# Patient Record
Sex: FEMALE | Race: WHITE | NOT HISPANIC OR LATINO | URBAN - METROPOLITAN AREA
[De-identification: names, ages, dates, MRNs, and addresses within clinical notes are randomized per-mention and may not be internally consistent; named-entity substitution may affect disease eponyms.]

---

## 2023-06-16 ENCOUNTER — INPATIENT (INPATIENT)
Facility: HOSPITAL | Age: 28
LOS: 2 days | Discharge: HOME | DRG: 690 | End: 2023-06-19
Attending: HOSPITALIST | Admitting: STUDENT IN AN ORGANIZED HEALTH CARE EDUCATION/TRAINING PROGRAM
Payer: COMMERCIAL

## 2023-06-16 VITALS
TEMPERATURE: 98 F | DIASTOLIC BLOOD PRESSURE: 77 MMHG | HEART RATE: 89 BPM | RESPIRATION RATE: 15 BRPM | HEIGHT: 67 IN | WEIGHT: 209.44 LBS | OXYGEN SATURATION: 97 % | SYSTOLIC BLOOD PRESSURE: 118 MMHG

## 2023-06-16 LAB
ALBUMIN SERPL ELPH-MCNC: 3.6 G/DL — SIGNIFICANT CHANGE UP (ref 3.4–5)
ALP SERPL-CCNC: 264 U/L — HIGH (ref 40–120)
ALT FLD-CCNC: 287 U/L — HIGH (ref 12–42)
ANION GAP SERPL CALC-SCNC: 7 MMOL/L — LOW (ref 9–16)
APPEARANCE UR: CLEAR — SIGNIFICANT CHANGE UP
AST SERPL-CCNC: 181 U/L — HIGH (ref 15–37)
BASOPHILS # BLD AUTO: 0.06 K/UL — SIGNIFICANT CHANGE UP (ref 0–0.2)
BASOPHILS NFR BLD AUTO: 0.7 % — SIGNIFICANT CHANGE UP (ref 0–2)
BILIRUB SERPL-MCNC: 1.5 MG/DL — HIGH (ref 0.2–1.2)
BILIRUB UR-MCNC: NEGATIVE — SIGNIFICANT CHANGE UP
BUN SERPL-MCNC: 10 MG/DL — SIGNIFICANT CHANGE UP (ref 7–23)
CALCIUM SERPL-MCNC: 9.2 MG/DL — SIGNIFICANT CHANGE UP (ref 8.5–10.5)
CHLORIDE SERPL-SCNC: 97 MMOL/L — SIGNIFICANT CHANGE UP (ref 96–108)
CO2 SERPL-SCNC: 25 MMOL/L — SIGNIFICANT CHANGE UP (ref 22–31)
COLOR SPEC: YELLOW — SIGNIFICANT CHANGE UP
CREAT SERPL-MCNC: 0.7 MG/DL — SIGNIFICANT CHANGE UP (ref 0.5–1.3)
DIFF PNL FLD: NEGATIVE — SIGNIFICANT CHANGE UP
EGFR: 121 ML/MIN/1.73M2 — SIGNIFICANT CHANGE UP
EOSINOPHIL # BLD AUTO: 0.16 K/UL — SIGNIFICANT CHANGE UP (ref 0–0.5)
EOSINOPHIL NFR BLD AUTO: 1.8 % — SIGNIFICANT CHANGE UP (ref 0–6)
FLUAV H1 2009 PAND RNA SPEC QL NAA+PROBE: SIGNIFICANT CHANGE UP
FLUAV H1 RNA SPEC QL NAA+PROBE: SIGNIFICANT CHANGE UP
FLUAV H3 RNA SPEC QL NAA+PROBE: SIGNIFICANT CHANGE UP
FLUAV SUBTYP SPEC NAA+PROBE: SIGNIFICANT CHANGE UP
FLUBV RNA SPEC QL NAA+PROBE: SIGNIFICANT CHANGE UP
GLUCOSE SERPL-MCNC: 113 MG/DL — HIGH (ref 70–99)
GLUCOSE UR QL: NEGATIVE MG/DL — SIGNIFICANT CHANGE UP
HCG SERPL-ACNC: 1 MIU/ML — SIGNIFICANT CHANGE UP
HCT VFR BLD CALC: 39.4 % — SIGNIFICANT CHANGE UP (ref 34.5–45)
HGB BLD-MCNC: 13.5 G/DL — SIGNIFICANT CHANGE UP (ref 11.5–15.5)
IMM GRANULOCYTES NFR BLD AUTO: 0.3 % — SIGNIFICANT CHANGE UP (ref 0–0.9)
KETONES UR-MCNC: NEGATIVE MG/DL — SIGNIFICANT CHANGE UP
LACTATE SERPL-SCNC: 0.7 MMOL/L — SIGNIFICANT CHANGE UP (ref 0.4–2)
LEUKOCYTE ESTERASE UR-ACNC: ABNORMAL
LIDOCAIN IGE QN: 104 U/L — SIGNIFICANT CHANGE UP (ref 73–393)
LYMPHOCYTES # BLD AUTO: 1.98 K/UL — SIGNIFICANT CHANGE UP (ref 1–3.3)
LYMPHOCYTES # BLD AUTO: 21.8 % — SIGNIFICANT CHANGE UP (ref 13–44)
MCHC RBC-ENTMCNC: 30.8 PG — SIGNIFICANT CHANGE UP (ref 27–34)
MCHC RBC-ENTMCNC: 34.3 GM/DL — SIGNIFICANT CHANGE UP (ref 32–36)
MCV RBC AUTO: 90 FL — SIGNIFICANT CHANGE UP (ref 80–100)
MONOCYTES # BLD AUTO: 1.4 K/UL — HIGH (ref 0–0.9)
MONOCYTES NFR BLD AUTO: 15.4 % — HIGH (ref 2–14)
NEUTROPHILS # BLD AUTO: 5.45 K/UL — SIGNIFICANT CHANGE UP (ref 1.8–7.4)
NEUTROPHILS NFR BLD AUTO: 60 % — SIGNIFICANT CHANGE UP (ref 43–77)
NITRITE UR-MCNC: NEGATIVE — SIGNIFICANT CHANGE UP
NRBC # BLD: 0 /100 WBCS — SIGNIFICANT CHANGE UP (ref 0–0)
PCO2 BLDV: 51 MMHG — HIGH (ref 39–42)
PH BLDV: 7.35 — SIGNIFICANT CHANGE UP (ref 7.32–7.43)
PH UR: 6.5 — SIGNIFICANT CHANGE UP (ref 5–8)
PLATELET # BLD AUTO: 243 K/UL — SIGNIFICANT CHANGE UP (ref 150–400)
PO2 BLDV: <35 MMHG — SIGNIFICANT CHANGE UP (ref 25–45)
POTASSIUM SERPL-MCNC: 4.1 MMOL/L — SIGNIFICANT CHANGE UP (ref 3.5–5.3)
POTASSIUM SERPL-SCNC: 4.1 MMOL/L — SIGNIFICANT CHANGE UP (ref 3.5–5.3)
PROT SERPL-MCNC: 8.2 G/DL — SIGNIFICANT CHANGE UP (ref 6.4–8.2)
PROT UR-MCNC: NEGATIVE MG/DL — SIGNIFICANT CHANGE UP
RAPID RVP RESULT: SIGNIFICANT CHANGE UP
RBC # BLD: 4.38 M/UL — SIGNIFICANT CHANGE UP (ref 3.8–5.2)
RBC # FLD: 11.1 % — SIGNIFICANT CHANGE UP (ref 10.3–14.5)
SAO2 % BLDV: 27.6 % — LOW (ref 67–88)
SARS-COV-2 RNA SPEC QL NAA+PROBE: SIGNIFICANT CHANGE UP
SODIUM SERPL-SCNC: 129 MMOL/L — LOW (ref 132–145)
SP GR SPEC: 1 — LOW (ref 1–1.03)
UROBILINOGEN FLD QL: 1 MG/DL — SIGNIFICANT CHANGE UP (ref 0.2–1)
WBC # BLD: 9.08 K/UL — SIGNIFICANT CHANGE UP (ref 3.8–10.5)
WBC # FLD AUTO: 9.08 K/UL — SIGNIFICANT CHANGE UP (ref 3.8–10.5)

## 2023-06-16 PROCEDURE — 99285 EMERGENCY DEPT VISIT HI MDM: CPT

## 2023-06-16 PROCEDURE — 74177 CT ABD & PELVIS W/CONTRAST: CPT | Mod: 26

## 2023-06-16 RX ORDER — PIPERACILLIN AND TAZOBACTAM 4; .5 G/20ML; G/20ML
3.38 INJECTION, POWDER, LYOPHILIZED, FOR SOLUTION INTRAVENOUS ONCE
Refills: 0 | Status: COMPLETED | OUTPATIENT
Start: 2023-06-16 | End: 2023-06-16

## 2023-06-16 RX ORDER — KETOROLAC TROMETHAMINE 30 MG/ML
15 SYRINGE (ML) INJECTION ONCE
Refills: 0 | Status: DISCONTINUED | OUTPATIENT
Start: 2023-06-16 | End: 2023-06-16

## 2023-06-16 RX ORDER — CEFTRIAXONE 500 MG/1
1000 INJECTION, POWDER, FOR SOLUTION INTRAMUSCULAR; INTRAVENOUS ONCE
Refills: 0 | Status: COMPLETED | OUTPATIENT
Start: 2023-06-16 | End: 2023-06-16

## 2023-06-16 RX ORDER — SODIUM CHLORIDE 9 MG/ML
1000 INJECTION INTRAMUSCULAR; INTRAVENOUS; SUBCUTANEOUS ONCE
Refills: 0 | Status: COMPLETED | OUTPATIENT
Start: 2023-06-16 | End: 2023-06-16

## 2023-06-16 RX ADMIN — Medication 15 MILLIGRAM(S): at 19:13

## 2023-06-16 RX ADMIN — PIPERACILLIN AND TAZOBACTAM 200 GRAM(S): 4; .5 INJECTION, POWDER, LYOPHILIZED, FOR SOLUTION INTRAVENOUS at 19:57

## 2023-06-16 RX ADMIN — SODIUM CHLORIDE 1000 MILLILITER(S): 9 INJECTION INTRAMUSCULAR; INTRAVENOUS; SUBCUTANEOUS at 18:01

## 2023-06-16 RX ADMIN — CEFTRIAXONE 100 MILLIGRAM(S): 500 INJECTION, POWDER, FOR SOLUTION INTRAMUSCULAR; INTRAVENOUS at 18:46

## 2023-06-16 NOTE — ED PROVIDER NOTE - NS ED ROS FT
CONST: no fevers, no chills  EYES: no pain, no vision changes  ENT: no sore throat, no ear pain, no change in hearing  CV: no chest pain, no leg swelling  RESP: no shortness of breath, no cough  ABD: no abdominal pain, no nausea, no vomiting, no diarrhea  : no dysuria, +flank pain, no hematuria  MSK: +back pain, no extremity pain  NEURO: no headache or additional neurologic complaints  HEME: no easy bleeding  SKIN:  no rash

## 2023-06-16 NOTE — ED ADULT TRIAGE NOTE - PATIENT ON (OXYGEN DELIVERY METHOD)
1. What PRN did patient receive? Anti-Psychotic (Zyprexa/Thorazine/Haldol/Risperdal/Seroquel/Abilify)    2. What was the patient doing that led to the PRN medication? Agitation, Anxiety and Sleep; Patient woke up and was agitated when redirected to bed.    3. Did they require R/S? NO    4. Side effects to PRN medication? None    5. After 1 Hour, patient appeared: Other; patient was still awake and restless; wanted to walk around and watch movies.         room air

## 2023-06-16 NOTE — ED ADULT NURSE NOTE - NSFALLUNIVINTERV_ED_ALL_ED
Bed/Stretcher in lowest position, wheels locked, appropriate side rails in place/Call bell, personal items and telephone in reach/Instruct patient to call for assistance before getting out of bed/chair/stretcher/Non-slip footwear applied when patient is off stretcher/[place] to call system/Physically safe environment - no spills, clutter or unnecessary equipment/Purposeful proactive rounding/Room/bathroom lighting operational, light cord in reach

## 2023-06-16 NOTE — ED PROVIDER NOTE - CLINICAL SUMMARY MEDICAL DECISION MAKING FREE TEXT BOX
28 year old F with PMH anxiety, ADHD, hypothyroidism presenting with L flank pain and hematuria. VSS, afebrile, non toxic appearing. Exam with L CVA tenderness. DDx includes pyelonephritis vs hemorrhagic cystitis vs nephrolithiasis. Will get labs, UA/UC, CT A/P, treat pain. Dispo per findings.

## 2023-06-16 NOTE — ED PROVIDER NOTE - PHYSICAL EXAMINATION
GENERAL: Awake, alert, NAD  HEENT: NC/AT, moist mucous membranes, PERRL, EOMI  LUNGS: CTAB, no wheezes or crackles   CARDIAC: RRR, no m/r/g  ABDOMEN: Soft, normal BS, non tender, non distended, no rebound, no guarding  BACK: No midline spinal tenderness, left CVA tenderness  EXT: No edema, no calf tenderness, 2+ DP pulses bilaterally, no deformities.  NEURO: A&Ox3. Moving all extremities.  SKIN: Warm and dry. No rash.  PSYCH: Normal affect.

## 2023-06-16 NOTE — ED PROVIDER NOTE - OBJECTIVE STATEMENT
28 year old F with PMH anxiety, ADHD, hypothyroidism presenting with L flank pain and hematuria. Patient has had this pain for 1.5 weeks. Seen at [facility] on 6/14. per paperwork review, patient was tachycardic, UA positive for WBC but patient not symptomatic at that time, also found to have elevated LFTs, but no RUQ pain. Patient's pain and tachycardiac improved. Ucx sent. Patient was offered further imaging at that time, but declined, preferred to follow up outpatient. Culture grew e coli, but was not treated with abx. Patient had telehealth visit and was referred to ED to r/o pyelonephritis. Patient notes "low grade fevers," but denies vomiting, diarrhea, dysuria, inability to urinate, SOB, CP. History of laparoscopic abdominal surgery. Remote history of UTIs.

## 2023-06-16 NOTE — ED PROVIDER NOTE - ATTENDING CONTRIBUTION TO CARE
27yo F no pmh presents with 1.5wks of L flank pain, seen at [facility] 2d ago for same, found to have UTI (but not started on abx), also found to have elevated LFTs, but left prior to getting CT a/p.  Now back with worsening pain with fever and chills.  UCx from [facility] +E. Coli.  On exam afebrile, VSS, well appearing, with diffuse abd TTP and L CVA TTP.  WBC wnl.  UA positive for UTI.  CT a/p shows L pyelo with abscess.  Pt given ceftriaxone initially, will expand coverage to zosyn as well.  pt has allergy to vancomycin though symptoms sound like red man syn.

## 2023-06-17 DIAGNOSIS — R74.01 ELEVATION OF LEVELS OF LIVER TRANSAMINASE LEVELS: ICD-10-CM

## 2023-06-17 DIAGNOSIS — N12 TUBULO-INTERSTITIAL NEPHRITIS, NOT SPECIFIED AS ACUTE OR CHRONIC: ICD-10-CM

## 2023-06-17 DIAGNOSIS — Z29.9 ENCOUNTER FOR PROPHYLACTIC MEASURES, UNSPECIFIED: ICD-10-CM

## 2023-06-17 DIAGNOSIS — E87.1 HYPO-OSMOLALITY AND HYPONATREMIA: ICD-10-CM

## 2023-06-17 DIAGNOSIS — E03.9 HYPOTHYROIDISM, UNSPECIFIED: ICD-10-CM

## 2023-06-17 LAB
ANION GAP SERPL CALC-SCNC: 6 MMOL/L — SIGNIFICANT CHANGE UP (ref 5–17)
BASOPHILS # BLD AUTO: 0.04 K/UL — SIGNIFICANT CHANGE UP (ref 0–0.2)
BASOPHILS NFR BLD AUTO: 0.7 % — SIGNIFICANT CHANGE UP (ref 0–2)
BUN SERPL-MCNC: 9 MG/DL — SIGNIFICANT CHANGE UP (ref 7–23)
CALCIUM SERPL-MCNC: 9.1 MG/DL — SIGNIFICANT CHANGE UP (ref 8.4–10.5)
CHLORIDE SERPL-SCNC: 106 MMOL/L — SIGNIFICANT CHANGE UP (ref 96–108)
CO2 SERPL-SCNC: 22 MMOL/L — SIGNIFICANT CHANGE UP (ref 22–31)
CREAT ?TM UR-MCNC: 137 MG/DL — SIGNIFICANT CHANGE UP
CREAT SERPL-MCNC: 0.71 MG/DL — SIGNIFICANT CHANGE UP (ref 0.5–1.3)
EGFR: 119 ML/MIN/1.73M2 — SIGNIFICANT CHANGE UP
EOSINOPHIL # BLD AUTO: 0.12 K/UL — SIGNIFICANT CHANGE UP (ref 0–0.5)
EOSINOPHIL NFR BLD AUTO: 2 % — SIGNIFICANT CHANGE UP (ref 0–6)
GLUCOSE SERPL-MCNC: 123 MG/DL — HIGH (ref 70–99)
HAV IGM SER-ACNC: SIGNIFICANT CHANGE UP
HBV CORE IGM SER-ACNC: SIGNIFICANT CHANGE UP
HBV SURFACE AG SER-ACNC: SIGNIFICANT CHANGE UP
HCT VFR BLD CALC: 34.9 % — SIGNIFICANT CHANGE UP (ref 34.5–45)
HCV AB S/CO SERPL IA: 0.04 S/CO — SIGNIFICANT CHANGE UP
HCV AB SERPL-IMP: SIGNIFICANT CHANGE UP
HETEROPH AB TITR SER AGGL: NEGATIVE — SIGNIFICANT CHANGE UP
HGB BLD-MCNC: 12 G/DL — SIGNIFICANT CHANGE UP (ref 11.5–15.5)
HIV 1+2 AB+HIV1 P24 AG SERPL QL IA: SIGNIFICANT CHANGE UP
IMM GRANULOCYTES NFR BLD AUTO: 0.3 % — SIGNIFICANT CHANGE UP (ref 0–0.9)
LYMPHOCYTES # BLD AUTO: 1.38 K/UL — SIGNIFICANT CHANGE UP (ref 1–3.3)
LYMPHOCYTES # BLD AUTO: 23.1 % — SIGNIFICANT CHANGE UP (ref 13–44)
MAGNESIUM SERPL-MCNC: 2.2 MG/DL — SIGNIFICANT CHANGE UP (ref 1.6–2.6)
MCHC RBC-ENTMCNC: 31.1 PG — SIGNIFICANT CHANGE UP (ref 27–34)
MCHC RBC-ENTMCNC: 34.4 GM/DL — SIGNIFICANT CHANGE UP (ref 32–36)
MCV RBC AUTO: 90.4 FL — SIGNIFICANT CHANGE UP (ref 80–100)
MONOCYTES # BLD AUTO: 0.84 K/UL — SIGNIFICANT CHANGE UP (ref 0–0.9)
MONOCYTES NFR BLD AUTO: 14.1 % — HIGH (ref 2–14)
NEUTROPHILS # BLD AUTO: 3.57 K/UL — SIGNIFICANT CHANGE UP (ref 1.8–7.4)
NEUTROPHILS NFR BLD AUTO: 59.8 % — SIGNIFICANT CHANGE UP (ref 43–77)
NRBC # BLD: 0 /100 WBCS — SIGNIFICANT CHANGE UP (ref 0–0)
OSMOLALITY SERPL: 286 MOSM/KG — SIGNIFICANT CHANGE UP (ref 275–300)
OSMOLALITY UR: 467 MOSM/KG — SIGNIFICANT CHANGE UP (ref 300–900)
PHOSPHATE SERPL-MCNC: 4 MG/DL — SIGNIFICANT CHANGE UP (ref 2.5–4.5)
PLATELET # BLD AUTO: 225 K/UL — SIGNIFICANT CHANGE UP (ref 150–400)
POTASSIUM SERPL-MCNC: 4.2 MMOL/L — SIGNIFICANT CHANGE UP (ref 3.5–5.3)
POTASSIUM SERPL-SCNC: 4.2 MMOL/L — SIGNIFICANT CHANGE UP (ref 3.5–5.3)
RBC # BLD: 3.86 M/UL — SIGNIFICANT CHANGE UP (ref 3.8–5.2)
RBC # FLD: 11.2 % — SIGNIFICANT CHANGE UP (ref 10.3–14.5)
SODIUM SERPL-SCNC: 134 MMOL/L — LOW (ref 135–145)
SODIUM UR-SCNC: 32 MMOL/L — SIGNIFICANT CHANGE UP
WBC # BLD: 5.97 K/UL — SIGNIFICANT CHANGE UP (ref 3.8–10.5)
WBC # FLD AUTO: 5.97 K/UL — SIGNIFICANT CHANGE UP (ref 3.8–10.5)

## 2023-06-17 PROCEDURE — 99223 1ST HOSP IP/OBS HIGH 75: CPT | Mod: GC

## 2023-06-17 PROCEDURE — 76705 ECHO EXAM OF ABDOMEN: CPT | Mod: 26

## 2023-06-17 RX ORDER — LIDOCAINE 4 G/100G
1 CREAM TOPICAL DAILY
Refills: 0 | Status: DISCONTINUED | OUTPATIENT
Start: 2023-06-17 | End: 2023-06-19

## 2023-06-17 RX ORDER — LEVOTHYROXINE SODIUM 125 MCG
75 TABLET ORAL DAILY
Refills: 0 | Status: DISCONTINUED | OUTPATIENT
Start: 2023-06-17 | End: 2023-06-19

## 2023-06-17 RX ORDER — ACETAMINOPHEN 500 MG
650 TABLET ORAL EVERY 6 HOURS
Refills: 0 | Status: DISCONTINUED | OUTPATIENT
Start: 2023-06-17 | End: 2023-06-19

## 2023-06-17 RX ORDER — LEVONORGESTREL 1.5 MG/1
1 TABLET ORAL
Refills: 0 | DISCHARGE

## 2023-06-17 RX ORDER — PIPERACILLIN AND TAZOBACTAM 4; .5 G/20ML; G/20ML
3.38 INJECTION, POWDER, LYOPHILIZED, FOR SOLUTION INTRAVENOUS EVERY 8 HOURS
Refills: 0 | Status: DISCONTINUED | OUTPATIENT
Start: 2023-06-17 | End: 2023-06-17

## 2023-06-17 RX ORDER — KETOROLAC TROMETHAMINE 30 MG/ML
15 SYRINGE (ML) INJECTION ONCE
Refills: 0 | Status: DISCONTINUED | OUTPATIENT
Start: 2023-06-17 | End: 2023-06-17

## 2023-06-17 RX ORDER — LANOLIN ALCOHOL/MO/W.PET/CERES
3 CREAM (GRAM) TOPICAL AT BEDTIME
Refills: 0 | Status: DISCONTINUED | OUTPATIENT
Start: 2023-06-17 | End: 2023-06-19

## 2023-06-17 RX ORDER — CEFTRIAXONE 500 MG/1
2000 INJECTION, POWDER, FOR SOLUTION INTRAMUSCULAR; INTRAVENOUS EVERY 24 HOURS
Refills: 0 | Status: DISCONTINUED | OUTPATIENT
Start: 2023-06-17 | End: 2023-06-19

## 2023-06-17 RX ORDER — ENOXAPARIN SODIUM 100 MG/ML
40 INJECTION SUBCUTANEOUS EVERY 24 HOURS
Refills: 0 | Status: DISCONTINUED | OUTPATIENT
Start: 2023-06-17 | End: 2023-06-19

## 2023-06-17 RX ORDER — LISDEXAMFETAMINE DIMESYLATE 70 MG/1
1 CAPSULE ORAL
Refills: 0 | DISCHARGE

## 2023-06-17 RX ORDER — CEFTRIAXONE 500 MG/1
1000 INJECTION, POWDER, FOR SOLUTION INTRAMUSCULAR; INTRAVENOUS EVERY 24 HOURS
Refills: 0 | Status: DISCONTINUED | OUTPATIENT
Start: 2023-06-17 | End: 2023-06-17

## 2023-06-17 RX ORDER — LEVOTHYROXINE SODIUM 125 MCG
1 TABLET ORAL
Refills: 0 | DISCHARGE

## 2023-06-17 RX ADMIN — LIDOCAINE 1 PATCH: 4 CREAM TOPICAL at 11:58

## 2023-06-17 RX ADMIN — Medication 650 MILLIGRAM(S): at 12:32

## 2023-06-17 RX ADMIN — LIDOCAINE 1 PATCH: 4 CREAM TOPICAL at 23:59

## 2023-06-17 RX ADMIN — PIPERACILLIN AND TAZOBACTAM 25 GRAM(S): 4; .5 INJECTION, POWDER, LYOPHILIZED, FOR SOLUTION INTRAVENOUS at 04:53

## 2023-06-17 RX ADMIN — Medication 650 MILLIGRAM(S): at 17:29

## 2023-06-17 RX ADMIN — Medication 75 MICROGRAM(S): at 06:27

## 2023-06-17 RX ADMIN — LIDOCAINE 1 PATCH: 4 CREAM TOPICAL at 18:00

## 2023-06-17 RX ADMIN — CEFTRIAXONE 100 MILLIGRAM(S): 500 INJECTION, POWDER, FOR SOLUTION INTRAMUSCULAR; INTRAVENOUS at 17:29

## 2023-06-17 RX ADMIN — Medication 650 MILLIGRAM(S): at 11:58

## 2023-06-17 RX ADMIN — Medication 650 MILLIGRAM(S): at 18:00

## 2023-06-17 RX ADMIN — Medication 650 MILLIGRAM(S): at 05:53

## 2023-06-17 RX ADMIN — Medication 650 MILLIGRAM(S): at 04:53

## 2023-06-17 RX ADMIN — Medication 15 MILLIGRAM(S): at 00:42

## 2023-06-17 NOTE — H&P ADULT - PROBLEM SELECTOR PLAN 5
F: None   E: Replete as necessary K>4 Mg>2  N: Reg diet   DVT Prophylaxis: Lovenox 40mg daily   GI prophylaxis: None   CODE STATUS: FULL

## 2023-06-17 NOTE — ED ADULT NURSE REASSESSMENT NOTE - NS ED NURSE REASSESS COMMENT FT1
Pt c/o abdominal pain and headache, requesting pain meds. KENDRA Méndez made aware of pain.
Report received from ISELA Orantes. Pt resting in stretcher comfortably. Denies complaints. Asking for food. Verified with MD Méndez that pt can eat.
recent fall oob/pain, back

## 2023-06-17 NOTE — PATIENT PROFILE ADULT - NSPROLASTMENSTRUAL_GEN_A_NUR
Problem: Patient Education:  Go to Education Activity Goal: Patient/Family Education Outcome: Progressing Towards Goal 
Pt receiving Chema 1/3 
 
 

[facility] VISIT NOTE 
 
1165 Pt arrived at [facility] ambulatory and in no distress for Chema 1/3. Assessment completed, pt c/o headache 7/10 PIV L Hand + blood return with no difficulty. Positive blood return noted Medications received: 
Reglan IVP Decadron IVP Benadryl IVP DHE IV 
Depacon IV Tolerated treatment well, no adverse reaction noted. PIV remained intact for infusion tomorrow and flushed per protocol. Positive blood return noted. Patient Vitals for the past 12 hrs: 
 Temp Pulse BP SpO2  
10/22/18 1154 97.4 °F (36.3 °C) 62 121/75 98 % 10/22/18 0824 97.5 °F (36.4 °C) 60 119/72 98 % [facility] D/C'd from [facility] ambulatory and in no distress accompanied by mother. Next appointment is 10/23/18 at 0800
pt hasn't had her period in 6 years

## 2023-06-17 NOTE — H&P ADULT - ASSESSMENT
29 y/o F with h/o UTIs, ADHD and hypothyroidism, pw 1.5 weeks of left flank pain found to have ecoli in urine and pyelonephritis on imaging, admitted for further yeung.

## 2023-06-17 NOTE — PATIENT PROFILE ADULT - VISION (WITH CORRECTIVE LENSES IF THE PATIENT USUALLY WEARS THEM):
near sighted/Partially impaired: cannot see medication labels or newsprint, but can see obstacles in path, and the surrounding layout; can count fingers at arm's length

## 2023-06-17 NOTE — H&P ADULT - PROBLEM SELECTOR PLAN 1
Urine culture from [facility] with 40 K CFU of ECOLI  UA at that time was pos for pyuria at that time as well  UA is currently neg for pyuria, not having dysuria or frequency, but with imaging suggestive and has CVA tenderness  - FU Urine CX  - IV CTX  - Try to obtain outside urine cultures, patient did not receive abx at [facility] or [facility] in the interim Urine culture from [facility] with 40 K CFU of ECOLI  UA at that time was pos for pyuria at that time as well  UA is currently neg for pyuria, not having dysuria or frequency, but with imaging suggestive and has CVA tenderness  Possibe early abscess on imaging  - IR consult   - FU Urine CX  - IV CTX  - Try to obtain outside urine cultures, patient did not receive abx at [place] or [facility] in the interim

## 2023-06-17 NOTE — H&P ADULT - ATTENDING COMMENTS
Patient was seen and examined at bedside on 6/17/2023 at 930 am. Patient has no acute complaints, overall feels well. Denies SOB, CP. ROS is otherwise negative. Vitals, labwork and pertinent imaging reviewed. Physical exam - NAD, AAO x 4, PERRLA, EOMI, MMM, supple neck, chest - CTA b/l. CV - rrr, s1s2, no m/r/g, abd - soft, NTND, + BS, ext - wwp, psych - normal affect    Plan  -C/w CTX - increase to 2 G  -F/u cultures  -Repeat LFT's, consider RUQ U/S  -ID consult

## 2023-06-17 NOTE — H&P ADULT - HISTORY OF PRESENT ILLNESS
28 year old F with PMH anxiety, ADHD, hypothyroidism presenting with L flank pain and hematuria. Patient has had this pain for 1.5 weeks. Seen at [facility] on 6/14. per paperwork review, patient was tachycardic, UA positive for WBC but patient not symptomatic at that time, also found to have elevated LFTs, but no RUQ pain. Patient's pain and tachycardiac improved. Ucx sent. Patient was offered further imaging at that time, but declined, preferred to follow up outpatient. Culture grew e coli, but was not treated with abx. Patient had telehealth visit and was referred to ED to r/o pyelonephritis. Patient notes "low grade fevers," but denies vomiting, diarrhea, dysuria, inability to urinate, SOB, CP. History of laparoscopic abdominal surgery. Remote history of UTIs. 28 year old F with PMH anxiety, ADHD, hypothyroidism presenting with L flank pain and ?hematuria being admitted for IV abx for pyelonephritis. Patient has had this pain for 1.5 weeks and presented to [facility] on 6/14. Per paperwork review, patient was tachycardic, UA positive for WBC but patient not symptomatic at that time, also found to have elevated LFTs, but no RUQ pain. Patient's pain and tachycardiac improved. Ucx sent. Patient was offered further imaging at that time, but declined, preferred to follow up outpatient and she was not given antibiotics. Cultures later grew e coli. Patient [facility] and was referred to ED to r/o pyelonephritis. Patient notes "low grade fevers," but denies vomiting, diarrhea, dysuria, inability to urinate, SOB, CP. History of laparoscopic abdominal surgery for ectopic pregnanct. Remote history of UTIs but no pyelonephritis. Patient with history of mono and reports having enlargement of liver and spleen on imaging, although not seen in [facility] or our chart. Patient reports ongoing low back pain. No further urinary sx or hematuria.

## 2023-06-17 NOTE — H&P ADULT - PROBLEM SELECTOR PLAN 4
Na 129, labs cw SIADH, likely from pain  Na was 134 on 6/14 at [place]  - mild, can monitor off IVF for now  - tylenol, lidocaine patches for pain

## 2023-06-17 NOTE — H&P ADULT - NSHPLABSRESULTS_GEN_ALL_CORE
LABS:                         13.5   9.08  )-----------( 243      ( 2023 17:34 )             39.4     -16    129<L>  |  97  |  10  ----------------------------<  113<H>  4.1   |  25  |  0.70    Ca    9.2      2023 17:34    TPro  8.2  /  Alb  3.6  /  TBili  1.5<H>  /  DBili  x   /  AST  181<H>  /  ALT  287<H>  /  AlkPhos  264<H>        Urinalysis Basic - ( 2023 17:34 )    Color: Yellow / Appearance: Clear / S.003 / pH: x  Gluc: x / Ketone: Negative mg/dL  / Bili: Negative / Urobili: 1.0 mg/dL   Blood: x / Protein: Negative mg/dL / Nitrite: Negative   Leuk Esterase: Large / RBC: <5 /HPF / WBC 5-10 /HPF   Sq Epi: x / Non Sq Epi: x / Bacteria: Few /HPF      RADIOLOGY, EKG & ADDITIONAL TESTS:

## 2023-06-17 NOTE — H&P ADULT - NSHPPHYSICALEXAM_GEN_ALL_CORE
------------------INCOMPLETE-----------------------  VITAL SIGNS:  T(C): 37 (06-17-23 @ 04:26), Max: 37.6 (06-16-23 @ 20:23)  T(F): 98.6 (06-17-23 @ 04:26), Max: 99.7 (06-16-23 @ 20:23)  HR: 91 (06-17-23 @ 04:14) (75 - 91)  BP: 110/74 (06-17-23 @ 04:14) (104/69 - 139/84)  BP(mean): --  RR: 17 (06-17-23 @ 04:14) (15 - 18)  SpO2: 96% (06-17-23 @ 04:14) (95% - 100%)  Wt(kg): --    PHYSICAL EXAM:    Constitutional: WDWN resting comfortably in bed; NAD  HEENT: NC/AT, PERRL, EOMI, anicteric sclera, no nasal discharge; uvula midline, no oropharyngeal erythema or exudates, MMM; no thyromegaly or anterior/posterior or submental DU; neck supple  Respiratory: CTA B/L; no W/R/R, no retractions  Cardiac: +S1/S2; RRR; no M/R/G appreciated  Gastrointestinal: abdomen soft, NT/ND, +BS, no rebound tenderness or guarding  Back: no CVAT B/L  Extremities: legs WWP, no clubbing or cyanosis; no peripheral edema  Vascular: 2+ distal pedal pulses B/L  Dermatologic: skin warm, dry and intact; no rashes, wounds, or scars  Neurologic: AAOx3; CNII-XII grossly intact; strength 5/5 and sensation intact in all 4 extremities; no focal deficits  Psychiatric: affect and characteristics of appearance, verbalizations, behaviors are appropriate VITAL SIGNS:  T(C): 37 (06-17-23 @ 04:26), Max: 37.6 (06-16-23 @ 20:23)  T(F): 98.6 (06-17-23 @ 04:26), Max: 99.7 (06-16-23 @ 20:23)  HR: 91 (06-17-23 @ 04:14) (75 - 91)  BP: 110/74 (06-17-23 @ 04:14) (104/69 - 139/84)  RR: 17 (06-17-23 @ 04:14) (15 - 18)  SpO2: 96% (06-17-23 @ 04:14) (95% - 100%)    PHYSICAL EXAM:    Constitutional: WDWN resting comfortably in bed; NAD  HEENT: NC/AT, PERRL, EOMI, anicteric sclera, no nasal discharge; uvula midline, no oropharyngeal erythema or exudates, MMM; no thyromegaly or anterior/posterior or submental DU; neck supple  Respiratory: CTA B/L; no W/R/R, no retractions  Cardiac: +S1/S2; RRR; no M/R/G appreciated  Gastrointestinal: abdomen soft, NT/ND, +BS, no rebound tenderness or guarding  Back: no CVAT B/L  Extremities: legs WWP, no clubbing or cyanosis; no peripheral edema  Vascular: 2+ distal pedal pulses B/L  Dermatologic: skin warm, dry and intact; no rashes, wounds, or scars  Neurologic: AAOx3; CNII-XII grossly intact; strength 5/5 and sensation intact in all 4 extremities; no focal deficits  Psychiatric: affect and characteristics of appearance, verbalizations, behaviors are appropriate VITAL SIGNS:  T(C): 37 (06-17-23 @ 04:26), Max: 37.6 (06-16-23 @ 20:23)  T(F): 98.6 (06-17-23 @ 04:26), Max: 99.7 (06-16-23 @ 20:23)  HR: 91 (06-17-23 @ 04:14) (75 - 91)  BP: 110/74 (06-17-23 @ 04:14) (104/69 - 139/84)  RR: 17 (06-17-23 @ 04:14) (15 - 18)  SpO2: 96% (06-17-23 @ 04:14) (95% - 100%)    PHYSICAL EXAM:    Constitutional: WDWN resting comfortably in bed; NAD  HEENT: NC/AT, PERRL, EOMI, anicteric sclera, no nasal discharge; uvula midline, no oropharyngeal erythema or exudates, MMM; no thyromegaly or anterior/posterior or submental DU; neck supple  Respiratory: CTA B/L; no W/R/R, no retractions  Cardiac: +S1/S2; RRR; no M/R/G appreciated  Gastrointestinal: abdomen soft, NT/ND, +BS, + RUQ tenderness  Back: + CVA tenderness   Extremities: legs WWP, no clubbing or cyanosis; no peripheral edema  Vascular: 2+ distal pedal pulses B/L  Dermatologic: skin warm, dry and intact; no rashes, wounds, or scars  Neurologic: AAOx3; CNII-XII grossly intact; strength 5/5 and sensation intact in all 4 extremities; no focal deficits  Psychiatric: affect and characteristics of appearance, verbalizations, behaviors are appropriate

## 2023-06-18 DIAGNOSIS — N15.1 RENAL AND PERINEPHRIC ABSCESS: ICD-10-CM

## 2023-06-18 LAB
ALBUMIN SERPL ELPH-MCNC: 3.8 G/DL — SIGNIFICANT CHANGE UP (ref 3.3–5)
ALP SERPL-CCNC: 235 U/L — HIGH (ref 40–120)
ALT FLD-CCNC: 137 U/L — HIGH (ref 10–45)
ANION GAP SERPL CALC-SCNC: 11 MMOL/L — SIGNIFICANT CHANGE UP (ref 5–17)
AST SERPL-CCNC: 40 U/L — SIGNIFICANT CHANGE UP (ref 10–40)
BASOPHILS # BLD AUTO: 0.06 K/UL — SIGNIFICANT CHANGE UP (ref 0–0.2)
BASOPHILS NFR BLD AUTO: 0.7 % — SIGNIFICANT CHANGE UP (ref 0–2)
BILIRUB SERPL-MCNC: 0.4 MG/DL — SIGNIFICANT CHANGE UP (ref 0.2–1.2)
BUN SERPL-MCNC: 8 MG/DL — SIGNIFICANT CHANGE UP (ref 7–23)
CALCIUM SERPL-MCNC: 9 MG/DL — SIGNIFICANT CHANGE UP (ref 8.4–10.5)
CHLORIDE SERPL-SCNC: 104 MMOL/L — SIGNIFICANT CHANGE UP (ref 96–108)
CO2 SERPL-SCNC: 23 MMOL/L — SIGNIFICANT CHANGE UP (ref 22–31)
CREAT SERPL-MCNC: 0.67 MG/DL — SIGNIFICANT CHANGE UP (ref 0.5–1.3)
EGFR: 122 ML/MIN/1.73M2 — SIGNIFICANT CHANGE UP
EOSINOPHIL # BLD AUTO: 0.21 K/UL — SIGNIFICANT CHANGE UP (ref 0–0.5)
EOSINOPHIL NFR BLD AUTO: 2.5 % — SIGNIFICANT CHANGE UP (ref 0–6)
GLUCOSE SERPL-MCNC: 112 MG/DL — HIGH (ref 70–99)
HCT VFR BLD CALC: 37 % — SIGNIFICANT CHANGE UP (ref 34.5–45)
HGB BLD-MCNC: 12.5 G/DL — SIGNIFICANT CHANGE UP (ref 11.5–15.5)
IMM GRANULOCYTES NFR BLD AUTO: 0.5 % — SIGNIFICANT CHANGE UP (ref 0–0.9)
LYMPHOCYTES # BLD AUTO: 1.9 K/UL — SIGNIFICANT CHANGE UP (ref 1–3.3)
LYMPHOCYTES # BLD AUTO: 22.8 % — SIGNIFICANT CHANGE UP (ref 13–44)
MAGNESIUM SERPL-MCNC: 1.9 MG/DL — SIGNIFICANT CHANGE UP (ref 1.6–2.6)
MCHC RBC-ENTMCNC: 30.6 PG — SIGNIFICANT CHANGE UP (ref 27–34)
MCHC RBC-ENTMCNC: 33.8 GM/DL — SIGNIFICANT CHANGE UP (ref 32–36)
MCV RBC AUTO: 90.5 FL — SIGNIFICANT CHANGE UP (ref 80–100)
MONOCYTES # BLD AUTO: 0.97 K/UL — HIGH (ref 0–0.9)
MONOCYTES NFR BLD AUTO: 11.6 % — SIGNIFICANT CHANGE UP (ref 2–14)
NEUTROPHILS # BLD AUTO: 5.16 K/UL — SIGNIFICANT CHANGE UP (ref 1.8–7.4)
NEUTROPHILS NFR BLD AUTO: 61.9 % — SIGNIFICANT CHANGE UP (ref 43–77)
NRBC # BLD: 0 /100 WBCS — SIGNIFICANT CHANGE UP (ref 0–0)
PHOSPHATE SERPL-MCNC: 3.8 MG/DL — SIGNIFICANT CHANGE UP (ref 2.5–4.5)
PLATELET # BLD AUTO: 259 K/UL — SIGNIFICANT CHANGE UP (ref 150–400)
POTASSIUM SERPL-MCNC: 4.4 MMOL/L — SIGNIFICANT CHANGE UP (ref 3.5–5.3)
POTASSIUM SERPL-SCNC: 4.4 MMOL/L — SIGNIFICANT CHANGE UP (ref 3.5–5.3)
PROT SERPL-MCNC: 7.5 G/DL — SIGNIFICANT CHANGE UP (ref 6–8.3)
RBC # BLD: 4.09 M/UL — SIGNIFICANT CHANGE UP (ref 3.8–5.2)
RBC # FLD: 11.1 % — SIGNIFICANT CHANGE UP (ref 10.3–14.5)
SODIUM SERPL-SCNC: 138 MMOL/L — SIGNIFICANT CHANGE UP (ref 135–145)
WBC # BLD: 8.34 K/UL — SIGNIFICANT CHANGE UP (ref 3.8–10.5)
WBC # FLD AUTO: 8.34 K/UL — SIGNIFICANT CHANGE UP (ref 3.8–10.5)

## 2023-06-18 PROCEDURE — 99232 SBSQ HOSP IP/OBS MODERATE 35: CPT | Mod: GC

## 2023-06-18 RX ADMIN — ENOXAPARIN SODIUM 40 MILLIGRAM(S): 100 INJECTION SUBCUTANEOUS at 21:31

## 2023-06-18 RX ADMIN — Medication 75 MICROGRAM(S): at 06:23

## 2023-06-18 RX ADMIN — CEFTRIAXONE 100 MILLIGRAM(S): 500 INJECTION, POWDER, FOR SOLUTION INTRAMUSCULAR; INTRAVENOUS at 17:47

## 2023-06-18 RX ADMIN — Medication 650 MILLIGRAM(S): at 21:38

## 2023-06-18 NOTE — PROGRESS NOTE ADULT - PROBLEM SELECTOR PLAN 5
F: None   E: Replete as necessary K>4 Mg>2  N: Reg diet   DVT Prophylaxis: Lovenox 40mg daily   GI prophylaxis: None   CODE STATUS: FULL Na 129, labs cw SIADH, likely from pain  Na was 134 on 6/14 at [place]  - mild, can monitor off IVF for now  - tylenol, lidocaine patches for pain

## 2023-06-18 NOTE — PROGRESS NOTE ADULT - PROBLEM SELECTOR PLAN 3
TSH 8 ad Free T4 1.0 at outside hospital  - cw home synthroid dose Patient with elevated transaminases and elevated bilirubin, increased from when was at [facility]  CT without hepatosplenomegaly, GB pathology,   Patient with history of EBV  - CMV and mono serologies  - Acute hepatitis panel at OSH was normal, HBV SAb positive only  - US

## 2023-06-18 NOTE — PROGRESS NOTE ADULT - PROBLEM SELECTOR PLAN 4
Na 129, labs cw SIADH, likely from pain  Na was 134 on 6/14 at [place]  - mild, can monitor off IVF for now  - tylenol, lidocaine patches for pain TSH 8 ad Free T4 1.0 at outside hospital  - cw home synthroid dose

## 2023-06-18 NOTE — PROGRESS NOTE ADULT - SUBJECTIVE AND OBJECTIVE BOX
Patient is a 28y old  Female who presents with a chief complaint of Fever (2023 05:52)    INTERVAL EVENTS: TRACI    SUBJECTIVE:  Patient was seen and examined at bedside. Patient has no acute complaints    Review of systems: Patient denies: fever, chills, dizziness, HA, Changes in vision, CP, dyspnea, nausea or vomiting, dysuria, changes in bowel movements, LE edema. Rest of 12 point Review of systems negative unless otherwise documented elsewhere in note.     Diet, Regular (23 @ 06:14) [Active]      MEDICATIONS:  MEDICATIONS  (STANDING):  cefTRIAXone   IVPB 2000 milliGRAM(s) IV Intermittent every 24 hours  enoxaparin Injectable 40 milliGRAM(s) SubCutaneous every 24 hours  levothyroxine 75 MICROGram(s) Oral daily  lidocaine   4% Patch 1 Patch Transdermal daily    MEDICATIONS  (PRN):  acetaminophen     Tablet .. 650 milliGRAM(s) Oral every 6 hours PRN Temp greater or equal to 38C (100.4F), Mild Pain (1 - 3)  melatonin 3 milliGRAM(s) Oral at bedtime PRN Insomnia      Allergies    vancomycin (Red Man Synd; Rash)    Intolerances        OBJECTIVE:  Vital Signs Last 24 Hrs  T(C): 36.4 (2023 05:07), Max: 36.9 (2023 15:35)  T(F): 97.5 (2023 05:07), Max: 98.5 (2023 15:35)  HR: 74 (2023 05:07) (73 - 80)  BP: 103/66 (2023 05:07) (103/66 - 125/70)  BP(mean): --  RR: 18 (2023 21:38) (18 - 18)  SpO2: 96% (2023 05:07) (96% - 99%)    Parameters below as of 2023 05:07  Patient On (Oxygen Delivery Method): room air      I&O's Summary      PHYSICAL EXAM:  Gen: Reclining in bed at time of exam, appears stated age  HEENT: NCAT, MMM, clear OP  Neck: supple, trachea at midline  CV: RRR, +S1/S2  Pulm: adequate respiratory effort, no increase in work of breathing  Abd: soft, NTND  Skin: warm and dry, no new rashes vs prior report  Ext: WWP, no LE edema  Neuro: AOx3, no gross focal neurological deficits  Psych: affect and behavior appropriate, pleasant at time of interview    LABS:                        12.5   8.34  )-----------( 259      ( 2023 05:30 )             37.0     06-18    138  |  104  |  8   ----------------------------<  112<H>  4.4   |  23  |  0.67    Ca    9.0      2023 05:30  Phos  3.8     06-18  Mg     1.9     -18    TPro  7.5  /  Alb  3.8  /  TBili  0.4  /  DBili  x   /  AST  40  /  ALT  137<H>  /  AlkPhos  235<H>  -18    LIVER FUNCTIONS - ( 2023 05:30 )  Alb: 3.8 g/dL / Pro: 7.5 g/dL / ALK PHOS: 235 U/L / ALT: 137 U/L / AST: 40 U/L / GGT: x             CAPILLARY BLOOD GLUCOSE        Urinalysis Basic - ( 2023 17:34 )    Color: Yellow / Appearance: Clear / S.003 / pH: x  Gluc: x / Ketone: Negative mg/dL  / Bili: Negative / Urobili: 1.0 mg/dL   Blood: x / Protein: Negative mg/dL / Nitrite: Negative   Leuk Esterase: Large / RBC: <5 /HPF / WBC 5-10 /HPF   Sq Epi: x / Non Sq Epi: x / Bacteria: Few /HPF        MICRODATA:    Culture - Blood (collected 2023 18:45)  Source: .Blood Blood-Peripheral  Preliminary Report (2023 02:02):    No growth to date.    Culture - Blood (collected 2023 18:45)  Source: .Blood Blood-Peripheral  Preliminary Report (2023 02:02):    No growth to date.        RADIOLOGY/OTHER STUDIES:

## 2023-06-18 NOTE — PROGRESS NOTE ADULT - PROBLEM SELECTOR PLAN 2
Patient with elevated transaminases and elevated bilirubin, increased from when was at [facility]  CT without hepatosplenomegaly, GB pathology,   Patient with history of EBV  - CMV and mono serologies  - Acute hepatitis panel at OSH was normal, HBV SAb positive only  - US Management as above  -ID consult in AM

## 2023-06-19 ENCOUNTER — TRANSCRIPTION ENCOUNTER (OUTPATIENT)
Age: 28
End: 2023-06-19

## 2023-06-19 VITALS
TEMPERATURE: 98 F | DIASTOLIC BLOOD PRESSURE: 85 MMHG | OXYGEN SATURATION: 95 % | SYSTOLIC BLOOD PRESSURE: 125 MMHG | RESPIRATION RATE: 17 BRPM | HEART RATE: 63 BPM

## 2023-06-19 LAB
-  AMIKACIN: SIGNIFICANT CHANGE UP
-  AMOXICILLIN/CLAVULANIC ACID: SIGNIFICANT CHANGE UP
-  AMPICILLIN/SULBACTAM: SIGNIFICANT CHANGE UP
-  AMPICILLIN: SIGNIFICANT CHANGE UP
-  AZTREONAM: SIGNIFICANT CHANGE UP
-  CEFAZOLIN: SIGNIFICANT CHANGE UP
-  CEFEPIME: SIGNIFICANT CHANGE UP
-  CEFOXITIN: SIGNIFICANT CHANGE UP
-  CEFTRIAXONE: SIGNIFICANT CHANGE UP
-  CEFUROXIME: SIGNIFICANT CHANGE UP
-  CIPROFLOXACIN: SIGNIFICANT CHANGE UP
-  ERTAPENEM: SIGNIFICANT CHANGE UP
-  GENTAMICIN: SIGNIFICANT CHANGE UP
-  IMIPENEM: SIGNIFICANT CHANGE UP
-  LEVOFLOXACIN: SIGNIFICANT CHANGE UP
-  MEROPENEM: SIGNIFICANT CHANGE UP
-  NITROFURANTOIN: SIGNIFICANT CHANGE UP
-  PIPERACILLIN/TAZOBACTAM: SIGNIFICANT CHANGE UP
-  TOBRAMYCIN: SIGNIFICANT CHANGE UP
-  TRIMETHOPRIM/SULFAMETHOXAZOLE: SIGNIFICANT CHANGE UP
ALBUMIN SERPL ELPH-MCNC: 4.1 G/DL — SIGNIFICANT CHANGE UP (ref 3.3–5)
ALP SERPL-CCNC: 243 U/L — HIGH (ref 40–120)
ALT FLD-CCNC: 111 U/L — HIGH (ref 10–45)
ANION GAP SERPL CALC-SCNC: 12 MMOL/L — SIGNIFICANT CHANGE UP (ref 5–17)
AST SERPL-CCNC: 39 U/L — SIGNIFICANT CHANGE UP (ref 10–40)
BASOPHILS # BLD AUTO: 0.04 K/UL — SIGNIFICANT CHANGE UP (ref 0–0.2)
BASOPHILS NFR BLD AUTO: 0.7 % — SIGNIFICANT CHANGE UP (ref 0–2)
BILIRUB SERPL-MCNC: 0.4 MG/DL — SIGNIFICANT CHANGE UP (ref 0.2–1.2)
BUN SERPL-MCNC: 7 MG/DL — SIGNIFICANT CHANGE UP (ref 7–23)
CALCIUM SERPL-MCNC: 9.9 MG/DL — SIGNIFICANT CHANGE UP (ref 8.4–10.5)
CHLORIDE SERPL-SCNC: 104 MMOL/L — SIGNIFICANT CHANGE UP (ref 96–108)
CMV IGG FLD QL: 2.5 U/ML — HIGH
CMV IGG SERPL-IMP: POSITIVE
CMV IGM FLD-ACNC: <8 AU/ML — SIGNIFICANT CHANGE UP
CMV IGM SERPL QL: NEGATIVE — SIGNIFICANT CHANGE UP
CO2 SERPL-SCNC: 24 MMOL/L — SIGNIFICANT CHANGE UP (ref 22–31)
CREAT SERPL-MCNC: 0.71 MG/DL — SIGNIFICANT CHANGE UP (ref 0.5–1.3)
CULTURE RESULTS: SIGNIFICANT CHANGE UP
EGFR: 119 ML/MIN/1.73M2 — SIGNIFICANT CHANGE UP
EOSINOPHIL # BLD AUTO: 0.14 K/UL — SIGNIFICANT CHANGE UP (ref 0–0.5)
EOSINOPHIL NFR BLD AUTO: 2.5 % — SIGNIFICANT CHANGE UP (ref 0–6)
GLUCOSE SERPL-MCNC: 109 MG/DL — HIGH (ref 70–99)
HCT VFR BLD CALC: 40.6 % — SIGNIFICANT CHANGE UP (ref 34.5–45)
HGB BLD-MCNC: 13.9 G/DL — SIGNIFICANT CHANGE UP (ref 11.5–15.5)
IMM GRANULOCYTES NFR BLD AUTO: 0.4 % — SIGNIFICANT CHANGE UP (ref 0–0.9)
LYMPHOCYTES # BLD AUTO: 1.98 K/UL — SIGNIFICANT CHANGE UP (ref 1–3.3)
LYMPHOCYTES # BLD AUTO: 35.9 % — SIGNIFICANT CHANGE UP (ref 13–44)
MAGNESIUM SERPL-MCNC: 2.2 MG/DL — SIGNIFICANT CHANGE UP (ref 1.6–2.6)
MCHC RBC-ENTMCNC: 30.5 PG — SIGNIFICANT CHANGE UP (ref 27–34)
MCHC RBC-ENTMCNC: 34.2 GM/DL — SIGNIFICANT CHANGE UP (ref 32–36)
MCV RBC AUTO: 89.2 FL — SIGNIFICANT CHANGE UP (ref 80–100)
METHOD TYPE: SIGNIFICANT CHANGE UP
MONOCYTES # BLD AUTO: 0.47 K/UL — SIGNIFICANT CHANGE UP (ref 0–0.9)
MONOCYTES NFR BLD AUTO: 8.5 % — SIGNIFICANT CHANGE UP (ref 2–14)
NEUTROPHILS # BLD AUTO: 2.87 K/UL — SIGNIFICANT CHANGE UP (ref 1.8–7.4)
NEUTROPHILS NFR BLD AUTO: 52 % — SIGNIFICANT CHANGE UP (ref 43–77)
NRBC # BLD: 0 /100 WBCS — SIGNIFICANT CHANGE UP (ref 0–0)
ORGANISM # SPEC MICROSCOPIC CNT: SIGNIFICANT CHANGE UP
ORGANISM # SPEC MICROSCOPIC CNT: SIGNIFICANT CHANGE UP
PHOSPHATE SERPL-MCNC: 3.5 MG/DL — SIGNIFICANT CHANGE UP (ref 2.5–4.5)
PLATELET # BLD AUTO: 354 K/UL — SIGNIFICANT CHANGE UP (ref 150–400)
POTASSIUM SERPL-MCNC: 4.4 MMOL/L — SIGNIFICANT CHANGE UP (ref 3.5–5.3)
POTASSIUM SERPL-SCNC: 4.4 MMOL/L — SIGNIFICANT CHANGE UP (ref 3.5–5.3)
PROT SERPL-MCNC: 8 G/DL — SIGNIFICANT CHANGE UP (ref 6–8.3)
RBC # BLD: 4.55 M/UL — SIGNIFICANT CHANGE UP (ref 3.8–5.2)
RBC # FLD: 10.9 % — SIGNIFICANT CHANGE UP (ref 10.3–14.5)
SODIUM SERPL-SCNC: 140 MMOL/L — SIGNIFICANT CHANGE UP (ref 135–145)
SPECIMEN SOURCE: SIGNIFICANT CHANGE UP
WBC # BLD: 5.52 K/UL — SIGNIFICANT CHANGE UP (ref 3.8–10.5)
WBC # FLD AUTO: 5.52 K/UL — SIGNIFICANT CHANGE UP (ref 3.8–10.5)

## 2023-06-19 PROCEDURE — 0225U NFCT DS DNA&RNA 21 SARSCOV2: CPT

## 2023-06-19 PROCEDURE — 83935 ASSAY OF URINE OSMOLALITY: CPT

## 2023-06-19 PROCEDURE — 86645 CMV ANTIBODY IGM: CPT

## 2023-06-19 PROCEDURE — 96376 TX/PRO/DX INJ SAME DRUG ADON: CPT

## 2023-06-19 PROCEDURE — 82803 BLOOD GASES ANY COMBINATION: CPT

## 2023-06-19 PROCEDURE — 99239 HOSP IP/OBS DSCHRG MGMT >30: CPT | Mod: GC

## 2023-06-19 PROCEDURE — 99222 1ST HOSP IP/OBS MODERATE 55: CPT

## 2023-06-19 PROCEDURE — 87389 HIV-1 AG W/HIV-1&-2 AB AG IA: CPT

## 2023-06-19 PROCEDURE — 81001 URINALYSIS AUTO W/SCOPE: CPT

## 2023-06-19 PROCEDURE — 87186 SC STD MICRODIL/AGAR DIL: CPT

## 2023-06-19 PROCEDURE — 87086 URINE CULTURE/COLONY COUNT: CPT

## 2023-06-19 PROCEDURE — 82570 ASSAY OF URINE CREATININE: CPT

## 2023-06-19 PROCEDURE — 99285 EMERGENCY DEPT VISIT HI MDM: CPT | Mod: 25

## 2023-06-19 PROCEDURE — 96375 TX/PRO/DX INJ NEW DRUG ADDON: CPT

## 2023-06-19 PROCEDURE — 86644 CMV ANTIBODY: CPT

## 2023-06-19 PROCEDURE — 83690 ASSAY OF LIPASE: CPT

## 2023-06-19 PROCEDURE — 84300 ASSAY OF URINE SODIUM: CPT

## 2023-06-19 PROCEDURE — 76705 ECHO EXAM OF ABDOMEN: CPT

## 2023-06-19 PROCEDURE — 96374 THER/PROPH/DIAG INJ IV PUSH: CPT | Mod: XU

## 2023-06-19 PROCEDURE — 86308 HETEROPHILE ANTIBODY SCREEN: CPT

## 2023-06-19 PROCEDURE — 87040 BLOOD CULTURE FOR BACTERIA: CPT

## 2023-06-19 PROCEDURE — 80048 BASIC METABOLIC PNL TOTAL CA: CPT

## 2023-06-19 PROCEDURE — 84100 ASSAY OF PHOSPHORUS: CPT

## 2023-06-19 PROCEDURE — 80053 COMPREHEN METABOLIC PANEL: CPT

## 2023-06-19 PROCEDURE — 80074 ACUTE HEPATITIS PANEL: CPT

## 2023-06-19 PROCEDURE — 84702 CHORIONIC GONADOTROPIN TEST: CPT

## 2023-06-19 PROCEDURE — 83605 ASSAY OF LACTIC ACID: CPT

## 2023-06-19 PROCEDURE — 36415 COLL VENOUS BLD VENIPUNCTURE: CPT

## 2023-06-19 PROCEDURE — 83735 ASSAY OF MAGNESIUM: CPT

## 2023-06-19 PROCEDURE — 74177 CT ABD & PELVIS W/CONTRAST: CPT

## 2023-06-19 PROCEDURE — 85025 COMPLETE CBC W/AUTO DIFF WBC: CPT

## 2023-06-19 PROCEDURE — 83930 ASSAY OF BLOOD OSMOLALITY: CPT

## 2023-06-19 RX ADMIN — CEFTRIAXONE 100 MILLIGRAM(S): 500 INJECTION, POWDER, FOR SOLUTION INTRAMUSCULAR; INTRAVENOUS at 17:10

## 2023-06-19 RX ADMIN — Medication 650 MILLIGRAM(S): at 12:02

## 2023-06-19 RX ADMIN — Medication 75 MICROGRAM(S): at 06:48

## 2023-06-19 RX ADMIN — Medication 650 MILLIGRAM(S): at 12:44

## 2023-06-19 NOTE — PROGRESS NOTE ADULT - ASSESSMENT
29 y/o F with h/o UTIs, ADHD and hypothyroidism, pw 1.5 weeks of left flank pain found to have ecoli in urine and pyelonephritis on imaging, admitted for further yeung. 
29 y/o F with h/o UTIs, ADHD and hypothyroidism, pw 1.5 weeks of left flank pain found to have ecoli in urine and pyelonephritis on imaging, admitted for further yeung.

## 2023-06-19 NOTE — PROGRESS NOTE ADULT - PROBLEM SELECTOR PLAN 1
Urine culture from [facility] with 40 K CFU of ECOLI  UA at that time was pos for pyuria at that time as well  UA is currently neg for pyuria, not having dysuria or frequency, but with imaging suggestive and has CVA tenderness  Possibe early abscess on imaging  - IR consult   - FU Urine CX  - IV CTX  - Try to obtain outside urine cultures, patient did not receive abx at [place] or [facility] in the interim
Urine culture from [facility] with 40 K CFU of ECOLI  UA at that time was pos for pyuria at that time as well  UA is currently neg for pyuria, not having dysuria or frequency, but with imaging suggestive and has CVA tenderness  Possibe early abscess on imaging  - IR consult   - FU Urine CX - showing E. Coli, awaiting sensitivities, ID consult  - IV CTX  - Try to obtain outside urine cultures, patient did not receive abx at [place] or [facility] in the interim

## 2023-06-19 NOTE — DISCHARGE NOTE PROVIDER - NSDCCPTREATMENT_GEN_ALL_CORE_FT
PRINCIPAL PROCEDURE  Procedure: CT abdomen  Findings and Treatment: Left-sided pyelonephritis. Possible small early abscess formation upper pole left kidney. Ureteritis bilaterally.

## 2023-06-19 NOTE — DISCHARGE NOTE PROVIDER - ATTENDING DISCHARGE PHYSICAL EXAMINATION:
Patient was seen and examined at bedside on 6/19/2023 at 930 am. Patient has no acute complaints, overall feels well. Denies SOB, CP. ROS is otherwise negative. Vitals, labwork and pertinent imaging reviewed. Physical exam - NAD, AAO x 4, PERRLA, EOMI, MMM, supple neck, chest - CTA b/l. CV - rrr, s1s2, no m/r/g, abd - soft, NTND, + BS, ext - wwp, psych - normal affect    Plan  -Cultures - NGTD  -Repeat LFT's, RUQ U/S unremarkable   -ID consulted - pt to be discharged on 2 week course of Bactrim  -Patient is medically ready for d/c

## 2023-06-19 NOTE — DISCHARGE NOTE NURSING/CASE MANAGEMENT/SOCIAL WORK - PATIENT PORTAL LINK FT
You can access the FollowMyHealth Patient Portal offered by [facility] by registering at the following website: [email or web address]. By joining [employer]’s FollowMyHealth portal, you will also be able to view your health information using other applications (apps) compatible with our system.

## 2023-06-19 NOTE — PROGRESS NOTE ADULT - PROBLEM SELECTOR PLAN 6
F: None   E: Replete as necessary K>4 Mg>2  N: Reg diet   DVT Prophylaxis: Lovenox 40mg daily   GI prophylaxis: None   CODE STATUS: FULL
F: None   E: Replete as necessary K>4 Mg>2  N: Reg diet   DVT Prophylaxis: Lovenox 40mg daily   GI prophylaxis: None   CODE STATUS: FULL

## 2023-06-19 NOTE — DISCHARGE NOTE PROVIDER - NSDCMRMEDTOKEN_GEN_ALL_CORE_FT
Mirena 52 mg intrauterine device: 1 by intrauterine administration  Synthroid 75 mcg (0.075 mg) oral tablet: 1 orally once a day  Vyvanse 30 mg oral capsule: 1 orally once a day   Bactrim  mg-160 mg oral tablet: 1 tab(s) orally 2 times a day  Mirena 52 mg intrauterine device: 1 by intrauterine administration  Synthroid 75 mcg (0.075 mg) oral tablet: 1 orally once a day  Vyvanse 30 mg oral capsule: 1 orally once a day

## 2023-06-19 NOTE — PROGRESS NOTE ADULT - PROBLEM SELECTOR PLAN 3
Downtrending  Patient with elevated transaminases and elevated bilirubin, increased from when was at [facility]  CT without hepatosplenomegaly, GB pathology,   Patient with history of EBV  - CMV and mono serologies  - Acute hepatitis panel at OSH was normal, HBV SAb positive only  - US

## 2023-06-19 NOTE — CONSULT NOTE ADULT - ASSESSMENT
29 y/o F with PMH anxiety, ADHD, hypothyroidism presenting with 2weeks L flank pain and admitted for pyelonephritis with formation of early L renal abscess. She was never febrile, not septic. Currently on CTX 2g IV Q24 hours. Patient is feeling much better and looking to go home this evening.  - switch to Bactrim 1 DS tablet PO Q12H for 14 days 6/19-7/3  - Repeat CT A/P as outpatient, and follow up with Dr. Reich as outpatient, her office will call Ms. Cooper to schedule an appointment 27 y/o F with PMH anxiety, ADHD, hypothyroidism presenting with 2weeks L flank pain and admitted for pyelonephritis with formation of early L renal abscess. She was never febrile, not septic. Urine cx growing e coli. Currently on CTX 2g IV Q24 hours. Patient is feeling much better and looking to go home this evening.   - switch to Bactrim 1 DS tablet PO Q12H for 14 days 6/19-7/3  - Repeat CT A/P as outpatient, and follow up with Dr. Reich as outpatient, her office will call Ms. Cooper to schedule an appointment

## 2023-06-19 NOTE — DISCHARGE NOTE PROVIDER - CARE PROVIDER_API CALL
Yesica Monroe  [facility]  [address] [place] [address]  Phone: [phone]  Fax: ([phone]  Scheduled Appointment: 06/20/2023 12:00 PM

## 2023-06-19 NOTE — DISCHARGE NOTE PROVIDER - HOSPITAL COURSE
#Discharge: do not delete    27 y/o F with h/o UTIs, ADHD and hypothyroidism, pw 1.5 weeks of left flank pain found to have ecoli in urine and pyelonephritis on imaging, admitted for further yeung.         # Pyelonephritis.    Urine culture from [facility] with 40 K CFU of ECOLI  UA at that time was pos for pyuria at that time as well  UA is currently neg for pyuria, not having dysuria or frequency, but with imaging suggestive and has CVA tenderness  Possibe early abscess on imaging  - IR consult   - FU Urine CX - showing E. Coli, awaiting sensitivities, ID consult  - IV CTX       # Renal abscess.     Management as above  -ID consulted      # Transaminitis.    Downtrending  Patient with elevated transaminases and elevated bilirubin, increased from when was at [place]  CT without hepatosplenomegaly, GB pathology,   Patient with history of EBV  - CMV and mono serologies  - Acute hepatitis panel at OSH was normal, HBV SAb positive only  - US.            Patient was discharged to: (home/[facility]/acute rehab/hospice, etc, and with what services – home health PT/RN? Home O2?)    New medications:   Changes to old medications:  Medications that were stopped:    Items to follow up as outpatient:    Physical exam at the time of discharge:       #Discharge: do not delete    27 y/o F with h/o UTIs, ADHD and hypothyroidism, pw 1.5 weeks of left flank pain found to have ecoli in urine and pyelonephritis on imaging, admitted for further yeung.         # Pyelonephritis.    Urine culture from [facility] with 40 K CFU of ECOLI  UA at that time was pos for pyuria at that time as well  UA is currently neg for pyuria, not having dysuria or frequency, but with imaging suggestive and has CVA tenderness  Possibe early abscess on imaging  - Urine CX - showing E. Coli, awaiting sensitivities, ID consult  - IV CTX 2g       # Renal abscess.     Management as above  -ID consulted      # Transaminitis.    Downtrending  Patient with elevated transaminases and elevated bilirubin, increased from when was at [place]  CT without hepatosplenomegaly, GB pathology,   Patient with history of EBV  - CMV and mono serologies  - Acute hepatitis panel at OSH was normal, HBV SAb positive only  - US-normal             Patient was discharged to: home      New medications:         Physical exam at the time of discharge:  GENERAL: NAD, lying in bed comfortably  HEAD:  Atraumatic, Normocephalic  EYES: EOMI, PERRLA, conjunctiva and sclera clear  ENT: Moist mucous membranes  NECK: Supple, No JVD  CHEST/LUNG: Clear to auscultation bilaterally; No rales, rhonchi, wheezing, or rubs. Unlabored respirations  HEART: Regular rate and rhythm; No murmurs, rubs, or gallops  ABDOMEN: Bowel sounds present; Soft, Nontender, Nondistended. No hepatomegally  EXTREMITIES:  2+ Peripheral Pulses, brisk capillary refill. No clubbing, cyanosis, or edema  NERVOUS SYSTEM:  Alert & Oriented X3, speech clear. No deficits   MSK: FROM all 4 extremities, full and equal strength  SKIN: No rashes or lesions     #Discharge: do not delete    27 y/o F with h/o UTIs, ADHD and hypothyroidism, pw 1.5 weeks of left flank pain found to have ecoli in urine and pyelonephritis on imaging, admitted for further yeung.         # Pyelonephritis.    Urine culture from [facility] with 40 K CFU of ECOLI  UA at that time was pos for pyuria at that time as well  UA is currently neg for pyuria, not having dysuria or frequency, but with imaging suggestive and has CVA tenderness  Possibe early abscess on imaging  - Urine CX - showing E. Coli, pansensitive  - ID consulted  - IV CTX 2g while in patient       # Renal abscess.     Management as above  -ID consulted      # Transaminitis.    Downtrending  Patient with elevated transaminases and elevated bilirubin, increased from when was at [place]  CT without hepatosplenomegaly, GB pathology,   Patient with history of EBV  - CMV and mono serologies  - Acute hepatitis panel at OSH was normal, HBV SAb positive only  - US-normal             Patient was discharged to: home      New medications:         Physical exam at the time of discharge:  GENERAL: NAD, lying in bed comfortably  HEAD:  Atraumatic, Normocephalic  EYES: EOMI, PERRLA, conjunctiva and sclera clear  ENT: Moist mucous membranes  NECK: Supple, No JVD  CHEST/LUNG: Clear to auscultation bilaterally; No rales, rhonchi, wheezing, or rubs. Unlabored respirations  HEART: Regular rate and rhythm; No murmurs, rubs, or gallops  ABDOMEN: Bowel sounds present; Soft, Nontender, Nondistended. No hepatomegally  EXTREMITIES:  2+ Peripheral Pulses, brisk capillary refill. No clubbing, cyanosis, or edema  NERVOUS SYSTEM:  Alert & Oriented X3, speech clear. No deficits   MSK: FROM all 4 extremities, full and equal strength  SKIN: No rashes or lesions     #Discharge: do not delete    27 y/o F with h/o UTIs, ADHD and hypothyroidism, pw 1.5 weeks of left flank pain found to have ecoli in urine and pyelonephritis on imaging. Patient treated with iv antibiotics, medically stable for discharge.     # Pyelonephritis.    Urine culture from [facility] with 40 K CFU of ECOLI  UA at that time was pos for pyuria at that time as well  UA is currently neg for pyuria, not having dysuria or frequency, but with imaging suggestive and has CVA tenderness  Possibe early abscess on imaging  - Urine CX - showing E. Coli, pansensitive  - ID consulted  - bactrim 1 tablet daily x 14 days (6/19-7/3)     # Renal abscess.     Management as above    # Transaminitis.    Downtrending  Patient with elevated transaminases and elevated bilirubin, increased from when was at [place]  CT without hepatosplenomegaly, GB pathology,   Patient with history of EBV  - CMV and mono serologies  - Acute hepatitis panel at OSH was normal, HBV SAb positive only  - US-normal     Patient was discharged to: home    New medications: bactrim        Physical exam at the time of discharge:  GENERAL: NAD, lying in bed comfortably  HEAD:  Atraumatic, Normocephalic  EYES: EOMI, PERRLA, conjunctiva and sclera clear  ENT: Moist mucous membranes  NECK: Supple, No JVD  CHEST/LUNG: Clear to auscultation bilaterally; No rales, rhonchi, wheezing, or rubs. Unlabored respirations  HEART: Regular rate and rhythm; No murmurs, rubs, or gallops  ABDOMEN: Bowel sounds present; Soft, Nontender, Nondistended. No hepatomegally  EXTREMITIES:  2+ Peripheral Pulses, brisk capillary refill. No clubbing, cyanosis, or edema  NERVOUS SYSTEM:  Alert & Oriented X3, speech clear. No deficits   MSK: FROM all 4 extremities, full and equal strength  SKIN: No rashes or lesions     #Discharge: do not delete    29 y/o F with h/o UTIs, ADHD and hypothyroidism, pw 1.5 weeks of left flank pain found to have ecoli in urine and pyelonephritis on imaging. Patient treated with iv antibiotics, medically stable for discharge.     # Pyelonephritis.    Urine culture from [facility] with 40 K CFU of ECOLI  UA at that time was pos for pyuria at that time as well  UA is currently neg for pyuria, not having dysuria or frequency, but with imaging suggestive and has CVA tenderness  Possibe early abscess on imaging  - Urine CX - showing E. Coli, pansensitive  - ID consulted  - bactrim 1 tablet daily x 14 days (6/19-7/3)     # Renal abscess.     Management as above    # Transaminitis.    Downtrending  Patient with elevated transaminases and elevated bilirubin, increased from when was at [place]  CT without hepatosplenomegaly, GB pathology,   Patient with history of EBV  - CMV and mono serologies  - Acute hepatitis panel at OSH was normal, HBV SAb positive only  - US-normal     # Hyponatremia   Resolved  2/2 poor PO intake and possible SIADH component     Patient was discharged to: home    New medications: bactrim        Physical exam at the time of discharge:  GENERAL: NAD, lying in bed comfortably  HEAD:  Atraumatic, Normocephalic  EYES: EOMI, PERRLA, conjunctiva and sclera clear  ENT: Moist mucous membranes  NECK: Supple, No JVD  CHEST/LUNG: Clear to auscultation bilaterally; No rales, rhonchi, wheezing, or rubs. Unlabored respirations  HEART: Regular rate and rhythm; No murmurs, rubs, or gallops  ABDOMEN: Bowel sounds present; Soft, Nontender, Nondistended. No hepatomegally  EXTREMITIES:  2+ Peripheral Pulses, brisk capillary refill. No clubbing, cyanosis, or edema  NERVOUS SYSTEM:  Alert & Oriented X3, speech clear. No deficits   MSK: FROM all 4 extremities, full and equal strength  SKIN: No rashes or lesions

## 2023-06-19 NOTE — PROGRESS NOTE ADULT - SUBJECTIVE AND OBJECTIVE BOX
Patient is a 28y old  Female who presents with a chief complaint of Fever (2023 05:52)    INTERVAL EVENTS: TRACI    SUBJECTIVE:  Patient was seen and examined at bedside. Patient has no acute complaints    Review of systems: Patient denies: fever, chills, dizziness, HA, Changes in vision, CP, dyspnea, nausea or vomiting, dysuria, changes in bowel movements, LE edema. Rest of 12 point Review of systems negative unless otherwise documented elsewhere in note.     Diet, Regular (23 @ 06:14) [Active]      MEDICATIONS:  MEDICATIONS  (STANDING):  cefTRIAXone   IVPB 2000 milliGRAM(s) IV Intermittent every 24 hours  enoxaparin Injectable 40 milliGRAM(s) SubCutaneous every 24 hours  levothyroxine 75 MICROGram(s) Oral daily  lidocaine   4% Patch 1 Patch Transdermal daily    MEDICATIONS  (PRN):  acetaminophen     Tablet .. 650 milliGRAM(s) Oral every 6 hours PRN Temp greater or equal to 38C (100.4F), Mild Pain (1 - 3)  melatonin 3 milliGRAM(s) Oral at bedtime PRN Insomnia      Allergies    vancomycin (Red Man Synd; Rash)    Intolerances        Vital Signs Last 24 Hrs  T(C): 36.8 (2023 09:26), Max: 36.8 (2023 09:26)  T(F): 98.2 (2023 09:26), Max: 98.2 (2023 09:26)  HR: 76 (2023 09:26) (67 - 86)  BP: 112/74 (2023 09:26) (104/72 - 127/81)  BP(mean): --  RR: 18 (2023 09:26) (18 - 18)  SpO2: 98% (2023 09:26) (94% - 98%)    Parameters below as of 2023 09:26  Patient On (Oxygen Delivery Method): room air          PHYSICAL EXAM:  Gen: Reclining in bed at time of exam, appears stated age  HEENT: NCAT, MMM, clear OP  Neck: supple, trachea at midline  CV: RRR, +S1/S2  Pulm: adequate respiratory effort, no increase in work of breathing  Abd: soft, NTND  Skin: warm and dry, no new rashes vs prior report  Ext: WWP, no LE edema  Neuro: AOx3, no gross focal neurological deficits  Psych: affect and behavior appropriate, pleasant at time of interview    LABS:                        12.5   8.34  )-----------( 259      ( 2023 05:30 )             37.0     -18    138  |  104  |  8   ----------------------------<  112<H>  4.4   |  23  |  0.67    Ca    9.0      2023 05:30  Phos  3.8     -18  Mg     1.9     18    TPro  7.5  /  Alb  3.8  /  TBili  0.4  /  DBili  x   /  AST  40  /  ALT  137<H>  /  AlkPhos  235<H>  18    LIVER FUNCTIONS - ( 2023 05:30 )  Alb: 3.8 g/dL / Pro: 7.5 g/dL / ALK PHOS: 235 U/L / ALT: 137 U/L / AST: 40 U/L / GGT: x             CAPILLARY BLOOD GLUCOSE        Urinalysis Basic - ( 2023 17:34 )    Color: Yellow / Appearance: Clear / S.003 / pH: x  Gluc: x / Ketone: Negative mg/dL  / Bili: Negative / Urobili: 1.0 mg/dL   Blood: x / Protein: Negative mg/dL / Nitrite: Negative   Leuk Esterase: Large / RBC: <5 /HPF / WBC 5-10 /HPF   Sq Epi: x / Non Sq Epi: x / Bacteria: Few /HPF          Culture - Blood (collected 2023 18:45)  Source: .Blood Blood-Peripheral  Preliminary Report (2023 02:02):    No growth to date.    Culture - Blood (collected 2023 18:45)  Source: .Blood Blood-Peripheral  Preliminary Report (2023 02:02):    No growth to date.    Culture - Urine (collected 2023 17:34)  Source: Clean Catch Clean Catch (Midstream)  Preliminary Report (2023 17:24):    50,000 - 99,000 CFU/mL Escherichia coli

## 2023-06-19 NOTE — CONSULT NOTE ADULT - ATTENDING COMMENTS
29 yo F with h/o UTIs, h/o ectopic pregnancy 2017, with L pyelo, CT with ~1 cm rounded hypodensity in upper pole that may represent early abscess formation, bilateral hyperenhancement of ureteral walls bilaterally, urine culture with E coli.  She is afebrile, clinically improved on ceftriaxone after initial two doses of pip-tazo.  Her E coli isolate is susceptible to TMP/SX.  She will need at least a 14 d course with repeat CT A/P w/wo IVC prior to end to ensure resolution of abscess.  Would have preferred to watch her but she is anxious to leave and clearly has improved.  I will arrange for outpatient f/u with me.

## 2023-06-19 NOTE — PROGRESS NOTE ADULT - PROBLEM SELECTOR PLAN 5
Resolved  Na 129, labs cw SIADH, likely from pain  Na was 134 on 6/14 at [place]  - mild, can monitor off IVF for now  - tylenol, lidocaine patches for pain

## 2023-06-19 NOTE — CONSULT NOTE ADULT - SUBJECTIVE AND OBJECTIVE BOX
INFECTIOUS DISEASES INITIAL CONSULT NOTE    HPI:  27 y/o F with PMH anxiety, ADHD, hypothyroidism presenting with 2weeks L flank pain. She to [facility] on 6/14 and per paperwork review, patient was tachycardic, UA positive for WBC but patient not symptomatic at that time, also found to have elevated LFTs, but no RUQ pain. Her pain and tachycardia improved. She was offered further imaging at that time, but declined preferring to follow up outpatient, discharged w/o abx. Cultures later grew e coli  and was referred to ED to r/o pyelonephritis. She has had myalgias, fatigue, and band like headache but denies chills, fever, night sweats, N/V, abd pain, dysuria, frequency. Remote history of UTIs 5-6 years ago but no pyelonephritis. She has history of mono and reports having enlargement of liver and spleen on imaging.      PAST MEDICAL & SURGICAL HISTORY:  Anxiety  ADHD  Hypothyroid  No significant past surgical history    Review of Systems:   otherwise negative other than noted above       ANTIBIOTICS:  MEDICATIONS  (STANDING):  cefTRIAXone   IVPB 2000 milliGRAM(s) IV Intermittent every 24 hours  enoxaparin Injectable 40 milliGRAM(s) SubCutaneous every 24 hours  levothyroxine 75 MICROGram(s) Oral daily  lidocaine   4% Patch 1 Patch Transdermal daily    MEDICATIONS  (PRN):  acetaminophen     Tablet .. 650 milliGRAM(s) Oral every 6 hours PRN Temp greater or equal to 38C (100.4F), Mild Pain (1 - 3)  melatonin 3 milliGRAM(s) Oral at bedtime PRN Insomnia      Allergies    vancomycin (Red Man Synd; Rash)    Intolerances        SOCIAL HISTORY:    FAMILY HISTORY:  No pertinent family history in first degree relatives     no FH leading to current infection    Vital Signs Last 24 Hrs  T(C): 36.4 (19 Jun 2023 15:31), Max: 36.8 (19 Jun 2023 09:26)  T(F): 97.6 (19 Jun 2023 15:31), Max: 98.2 (19 Jun 2023 09:26)  HR: 63 (19 Jun 2023 15:31) (63 - 86)  BP: 125/85 (19 Jun 2023 15:31) (104/72 - 125/85)  BP(mean): --  RR: 17 (19 Jun 2023 15:31) (17 - 18)  SpO2: 95% (19 Jun 2023 15:31) (95% - 98%)    Parameters below as of 19 Jun 2023 15:31  Patient On (Oxygen Delivery Method): room air        06-18-23 @ 07:01  -  06-19-23 @ 07:00  --------------------------------------------------------  IN: 50 mL / OUT: 0 mL / NET: 50 mL        PHYSICAL EXAM:  Constitutional: Young F awake in bed, comfortable appearing  Eyes: no scleral icterus or conjunctival pallor  ENT: normal oropharynx, no nose or ear abnormalities  Neck: the appearance of the neck was normal and the neck was supple.   Pulmonary: no increased work of breathing, CTA  Heart: RRR, normal S1 and S2, no murmur  Vascular: no LON, WWP  Abdomen: soft, non-tender, non-distended  Back: No CVA tenderness  Neurological: no focal deficits.   Skin: no rash, warm  Psychiatric: AOx3, normal affect      LABS:                        13.9   5.52  )-----------( 354      ( 19 Jun 2023 12:06 )             40.6     06-19    140  |  104  |  7   ----------------------------<  109<H>  4.4   |  24  |  0.71    Ca    9.9      19 Jun 2023 14:00  Phos  3.5     06-19  Mg     2.2     06-19    TPro  8.0  /  Alb  4.1  /  TBili  0.4  /  DBili  x   /  AST  39  /  ALT  111<H>  /  AlkPhos  243<H>  06-19          MICROBIOLOGY:    RADIOLOGY & ADDITIONAL STUDIES:    < from: CT Abdomen and Pelvis w/ IV Cont (06.16.23 @ 19:09) >  Left-sided pyelonephritis. Possible small early abscess formation upper   pole left kidney. Ureteritis bilaterally.    < end of copied text >

## 2023-06-19 NOTE — DISCHARGE NOTE PROVIDER - NSDCCPCAREPLAN_GEN_ALL_CORE_FT
PRINCIPAL DISCHARGE DIAGNOSIS  Diagnosis: Pyelonephritis  Assessment and Plan of Treatment: A kidney infection is a type of urinary tract infection (UTI). A kidney infection may begin in the tube that carries urine from the body (urethra) or in the bladder. The infection can travel to one or both kidneys. A kidney infection is also called pyelonephritis.  A kidney infection needs prompt medical treatment. If not treated properly, an infection can cause lasting damage to the kidneys. Or the bacteria can spread to the bloodstream and cause a dangerous infection.  Kidney infection treatment often includes antibiotics, which might be given in the hospital.     PRINCIPAL DISCHARGE DIAGNOSIS  Diagnosis: Pyelonephritis  Assessment and Plan of Treatment: A kidney infection is a type of urinary tract infection (UTI). A kidney infection may begin in the tube that carries urine from the body (urethra) or in the bladder. The infection can travel to one or both kidneys. A kidney infection is also called pyelonephritis. A kidney infection needs prompt medical treatment. If not treated properly, an infection can cause lasting damage to the kidneys. Or the bacteria can spread to the bloodstream and cause a dangerous infection. Kidney infection treatment often includes antibiotics, which might be given in the hospital. We treated you with antibiotics to kill the bacteria in your kidney and allow your kidneys to heal.     PRINCIPAL DISCHARGE DIAGNOSIS  Diagnosis: Pyelonephritis  Assessment and Plan of Treatment: A kidney infection is a type of urinary tract infection (UTI). A kidney infection may begin in the tube that carries urine from the body (urethra) or in the bladder. The infection can travel to one or both kidneys. A kidney infection is also called pyelonephritis. A kidney infection needs prompt medical treatment. If not treated properly, an infection can cause lasting damage to the kidneys. Or the bacteria can spread to the bloodstream and cause a dangerous infection. Kidney infection treatment often includes antibiotics, which might be given in the hospital. We treated you with antibiotics to kill the bacteria in your kidney and allow your kidneys to heal.  Please take:  BACTRIM 800-160mg 2X a day for 14 days   Please take all of the antibiotic pills until they are finished even if you begin to feel better.  If your symptoms return or you begin to feel fever or chills, please return to the hospital for evaluation.

## 2023-06-22 LAB
CULTURE RESULTS: SIGNIFICANT CHANGE UP
CULTURE RESULTS: SIGNIFICANT CHANGE UP
SPECIMEN SOURCE: SIGNIFICANT CHANGE UP
SPECIMEN SOURCE: SIGNIFICANT CHANGE UP

## 2023-06-23 DIAGNOSIS — N15.1 RENAL AND PERINEPHRIC ABSCESS: ICD-10-CM

## 2023-06-23 DIAGNOSIS — E22.2 SYNDROME OF INAPPROPRIATE SECRETION OF ANTIDIURETIC HORMONE: ICD-10-CM

## 2023-06-23 DIAGNOSIS — B96.20 UNSPECIFIED ESCHERICHIA COLI [E. COLI] AS THE CAUSE OF DISEASES CLASSIFIED ELSEWHERE: ICD-10-CM

## 2023-06-23 DIAGNOSIS — F41.9 ANXIETY DISORDER, UNSPECIFIED: ICD-10-CM

## 2023-06-23 DIAGNOSIS — Z87.440 PERSONAL HISTORY OF URINARY (TRACT) INFECTIONS: ICD-10-CM

## 2023-06-23 DIAGNOSIS — N12 TUBULO-INTERSTITIAL NEPHRITIS, NOT SPECIFIED AS ACUTE OR CHRONIC: ICD-10-CM

## 2023-06-23 DIAGNOSIS — E03.9 HYPOTHYROIDISM, UNSPECIFIED: ICD-10-CM

## 2023-06-23 DIAGNOSIS — R74.01 ELEVATION OF LEVELS OF LIVER TRANSAMINASE LEVELS: ICD-10-CM

## 2023-06-23 DIAGNOSIS — F90.9 ATTENTION-DEFICIT HYPERACTIVITY DISORDER, UNSPECIFIED TYPE: ICD-10-CM

## 2023-06-23 DIAGNOSIS — R10.814 LEFT LOWER QUADRANT ABDOMINAL TENDERNESS: ICD-10-CM

## 2023-06-30 PROBLEM — Z00.00 ENCOUNTER FOR PREVENTIVE HEALTH EXAMINATION: Status: ACTIVE | Noted: 2023-06-30

## 2023-07-13 PROBLEM — F90.9 ATTENTION-DEFICIT HYPERACTIVITY DISORDER, UNSPECIFIED TYPE: Chronic | Status: ACTIVE | Noted: 2023-06-17

## 2023-07-13 PROBLEM — E03.9 HYPOTHYROIDISM, UNSPECIFIED: Chronic | Status: ACTIVE | Noted: 2023-06-17

## 2023-07-13 PROBLEM — F41.9 ANXIETY DISORDER, UNSPECIFIED: Chronic | Status: ACTIVE | Noted: 2023-06-17

## 2023-07-20 ENCOUNTER — APPOINTMENT (OUTPATIENT)
Dept: ULTRASOUND IMAGING | Facility: HOSPITAL | Age: 28
End: 2023-07-20

## 2023-07-28 ENCOUNTER — APPOINTMENT (OUTPATIENT)
Dept: INFECTIOUS DISEASE | Facility: CLINIC | Age: 28
End: 2023-07-28

## 2023-08-08 ENCOUNTER — TRANSCRIPTION ENCOUNTER (OUTPATIENT)
Age: 28
End: 2023-08-08

## 2023-08-09 ENCOUNTER — APPOINTMENT (OUTPATIENT)
Dept: CT IMAGING | Facility: CLINIC | Age: 28
End: 2023-08-09

## 2023-08-09 ENCOUNTER — EMERGENCY (EMERGENCY)
Facility: HOSPITAL | Age: 28
LOS: 1 days | Discharge: HOME | End: 2023-08-09
Attending: EMERGENCY MEDICINE | Admitting: EMERGENCY MEDICINE
Payer: COMMERCIAL

## 2023-08-09 VITALS
DIASTOLIC BLOOD PRESSURE: 70 MMHG | HEIGHT: 67 IN | HEART RATE: 96 BPM | WEIGHT: 250 LBS | OXYGEN SATURATION: 96 % | TEMPERATURE: 99 F | RESPIRATION RATE: 18 BRPM | SYSTOLIC BLOOD PRESSURE: 105 MMHG

## 2023-08-09 VITALS
DIASTOLIC BLOOD PRESSURE: 80 MMHG | OXYGEN SATURATION: 98 % | HEART RATE: 79 BPM | SYSTOLIC BLOOD PRESSURE: 120 MMHG | RESPIRATION RATE: 16 BRPM | TEMPERATURE: 98 F

## 2023-08-09 DIAGNOSIS — F90.9 ATTENTION-DEFICIT HYPERACTIVITY DISORDER, UNSPECIFIED TYPE: ICD-10-CM

## 2023-08-09 DIAGNOSIS — Z88.1 ALLERGY STATUS TO OTHER ANTIBIOTIC AGENTS STATUS: ICD-10-CM

## 2023-08-09 DIAGNOSIS — Z20.822 CONTACT WITH AND (SUSPECTED) EXPOSURE TO COVID-19: ICD-10-CM

## 2023-08-09 DIAGNOSIS — R82.998 OTHER ABNORMAL FINDINGS IN URINE: ICD-10-CM

## 2023-08-09 DIAGNOSIS — Z87.448 PERSONAL HISTORY OF OTHER DISEASES OF URINARY SYSTEM: ICD-10-CM

## 2023-08-09 DIAGNOSIS — R50.9 FEVER, UNSPECIFIED: ICD-10-CM

## 2023-08-09 DIAGNOSIS — N39.0 URINARY TRACT INFECTION, SITE NOT SPECIFIED: ICD-10-CM

## 2023-08-09 DIAGNOSIS — E03.9 HYPOTHYROIDISM, UNSPECIFIED: ICD-10-CM

## 2023-08-09 DIAGNOSIS — G89.29 OTHER CHRONIC PAIN: ICD-10-CM

## 2023-08-09 DIAGNOSIS — F41.9 ANXIETY DISORDER, UNSPECIFIED: ICD-10-CM

## 2023-08-09 LAB
ALBUMIN SERPL ELPH-MCNC: 3.7 G/DL — SIGNIFICANT CHANGE UP (ref 3.4–5)
ALP SERPL-CCNC: 328 U/L — HIGH (ref 40–120)
ALT FLD-CCNC: 301 U/L — HIGH (ref 12–42)
ANION GAP SERPL CALC-SCNC: 11 MMOL/L — SIGNIFICANT CHANGE UP (ref 9–16)
APPEARANCE UR: ABNORMAL
APTT BLD: 31.5 SEC — SIGNIFICANT CHANGE UP (ref 24.5–35.6)
AST SERPL-CCNC: 201 U/L — HIGH (ref 15–37)
BACTERIA # UR AUTO: NEGATIVE /HPF — SIGNIFICANT CHANGE UP
BASOPHILS # BLD AUTO: 0.03 K/UL — SIGNIFICANT CHANGE UP (ref 0–0.2)
BASOPHILS NFR BLD AUTO: 0.8 % — SIGNIFICANT CHANGE UP (ref 0–2)
BILIRUB SERPL-MCNC: 1.2 MG/DL — SIGNIFICANT CHANGE UP (ref 0.2–1.2)
BILIRUB UR-MCNC: ABNORMAL
BUN SERPL-MCNC: 10 MG/DL — SIGNIFICANT CHANGE UP (ref 7–23)
CALCIUM SERPL-MCNC: 9.4 MG/DL — SIGNIFICANT CHANGE UP (ref 8.5–10.5)
CHLORIDE SERPL-SCNC: 100 MMOL/L — SIGNIFICANT CHANGE UP (ref 96–108)
CO2 SERPL-SCNC: 25 MMOL/L — SIGNIFICANT CHANGE UP (ref 22–31)
COLOR SPEC: SIGNIFICANT CHANGE UP
CREAT SERPL-MCNC: 0.87 MG/DL — SIGNIFICANT CHANGE UP (ref 0.5–1.3)
CRP SERPL-MCNC: >120 MG/L — HIGH (ref 0–9)
DIFF PNL FLD: NEGATIVE — SIGNIFICANT CHANGE UP
EGFR: 93 ML/MIN/1.73M2 — SIGNIFICANT CHANGE UP
EOSINOPHIL # BLD AUTO: 0 K/UL — SIGNIFICANT CHANGE UP (ref 0–0.5)
EOSINOPHIL NFR BLD AUTO: 0 % — SIGNIFICANT CHANGE UP (ref 0–6)
EPI CELLS # UR: PRESENT
FLUAV AG NPH QL: SIGNIFICANT CHANGE UP
FLUBV AG NPH QL: SIGNIFICANT CHANGE UP
GLUCOSE SERPL-MCNC: 93 MG/DL — SIGNIFICANT CHANGE UP (ref 70–99)
GLUCOSE UR QL: NEGATIVE MG/DL — SIGNIFICANT CHANGE UP
HCG SERPL-ACNC: 1 MIU/ML — SIGNIFICANT CHANGE UP
HCT VFR BLD CALC: 41.2 % — SIGNIFICANT CHANGE UP (ref 34.5–45)
HGB BLD-MCNC: 14 G/DL — SIGNIFICANT CHANGE UP (ref 11.5–15.5)
IMM GRANULOCYTES NFR BLD AUTO: 0.8 % — SIGNIFICANT CHANGE UP (ref 0–0.9)
INR BLD: 1.23 — HIGH (ref 0.85–1.18)
KETONES UR-MCNC: ABNORMAL MG/DL
LACTATE BLDV-MCNC: 1.3 MMOL/L — SIGNIFICANT CHANGE UP (ref 0.5–2)
LEUKOCYTE ESTERASE UR-ACNC: ABNORMAL
LG PLATELETS BLD QL AUTO: SLIGHT — SIGNIFICANT CHANGE UP
LYMPHOCYTES # BLD AUTO: 0.36 K/UL — LOW (ref 1–3.3)
LYMPHOCYTES # BLD AUTO: 9.7 % — LOW (ref 13–44)
MANUAL SMEAR VERIFICATION: SIGNIFICANT CHANGE UP
MCHC RBC-ENTMCNC: 29.7 PG — SIGNIFICANT CHANGE UP (ref 27–34)
MCHC RBC-ENTMCNC: 34 GM/DL — SIGNIFICANT CHANGE UP (ref 32–36)
MCV RBC AUTO: 87.5 FL — SIGNIFICANT CHANGE UP (ref 80–100)
MONOCYTES # BLD AUTO: 0.17 K/UL — SIGNIFICANT CHANGE UP (ref 0–0.9)
MONOCYTES NFR BLD AUTO: 4.6 % — SIGNIFICANT CHANGE UP (ref 2–14)
NEUTROPHILS # BLD AUTO: 3.13 K/UL — SIGNIFICANT CHANGE UP (ref 1.8–7.4)
NEUTROPHILS NFR BLD AUTO: 84.1 % — HIGH (ref 43–77)
NITRITE UR-MCNC: NEGATIVE — SIGNIFICANT CHANGE UP
NRBC # BLD: 0 /100 WBCS — SIGNIFICANT CHANGE UP (ref 0–0)
PH UR: 6.5 — SIGNIFICANT CHANGE UP (ref 5–8)
PLAT MORPH BLD: NORMAL — SIGNIFICANT CHANGE UP
PLATELET # BLD AUTO: 165 K/UL — SIGNIFICANT CHANGE UP (ref 150–400)
POTASSIUM SERPL-MCNC: 3.3 MMOL/L — LOW (ref 3.5–5.3)
POTASSIUM SERPL-SCNC: 3.3 MMOL/L — LOW (ref 3.5–5.3)
PROT SERPL-MCNC: 7.8 G/DL — SIGNIFICANT CHANGE UP (ref 6.4–8.2)
PROT UR-MCNC: 30 MG/DL
PROTHROM AB SERPL-ACNC: 13.8 SEC — HIGH (ref 9.5–13)
RBC # BLD: 4.71 M/UL — SIGNIFICANT CHANGE UP (ref 3.8–5.2)
RBC # FLD: 11.8 % — SIGNIFICANT CHANGE UP (ref 10.3–14.5)
RBC BLD AUTO: NORMAL — SIGNIFICANT CHANGE UP
RBC CASTS # UR COMP ASSIST: 1 /HPF — SIGNIFICANT CHANGE UP (ref 0–4)
RSV RNA NPH QL NAA+NON-PROBE: SIGNIFICANT CHANGE UP
SARS-COV-2 RNA SPEC QL NAA+PROBE: SIGNIFICANT CHANGE UP
SODIUM SERPL-SCNC: 136 MMOL/L — SIGNIFICANT CHANGE UP (ref 132–145)
SP GR SPEC: 1.02 — SIGNIFICANT CHANGE UP (ref 1–1.03)
TROPONIN I, HIGH SENSITIVITY RESULT: <4 NG/L — SIGNIFICANT CHANGE UP
UROBILINOGEN FLD QL: 1 MG/DL — SIGNIFICANT CHANGE UP (ref 0.2–1)
WBC # BLD: 3.72 K/UL — LOW (ref 3.8–10.5)
WBC # FLD AUTO: 3.72 K/UL — LOW (ref 3.8–10.5)
WBC UR QL: 5 /HPF — SIGNIFICANT CHANGE UP (ref 0–5)

## 2023-08-09 PROCEDURE — 71046 X-RAY EXAM CHEST 2 VIEWS: CPT | Mod: 26

## 2023-08-09 PROCEDURE — 70450 CT HEAD/BRAIN W/O DYE: CPT | Mod: 26

## 2023-08-09 PROCEDURE — 99285 EMERGENCY DEPT VISIT HI MDM: CPT

## 2023-08-09 PROCEDURE — 74177 CT ABD & PELVIS W/CONTRAST: CPT | Mod: 26

## 2023-08-09 RX ORDER — METOCLOPRAMIDE HCL 10 MG
10 TABLET ORAL ONCE
Refills: 0 | Status: COMPLETED | OUTPATIENT
Start: 2023-08-09 | End: 2023-08-09

## 2023-08-09 RX ORDER — ACETAMINOPHEN 500 MG
650 TABLET ORAL ONCE
Refills: 0 | Status: COMPLETED | OUTPATIENT
Start: 2023-08-09 | End: 2023-08-09

## 2023-08-09 RX ORDER — POTASSIUM CHLORIDE 20 MEQ
40 PACKET (EA) ORAL ONCE
Refills: 0 | Status: COMPLETED | OUTPATIENT
Start: 2023-08-09 | End: 2023-08-09

## 2023-08-09 RX ORDER — KETOROLAC TROMETHAMINE 30 MG/ML
15 SYRINGE (ML) INJECTION ONCE
Refills: 0 | Status: DISCONTINUED | OUTPATIENT
Start: 2023-08-09 | End: 2023-08-09

## 2023-08-09 RX ORDER — SODIUM CHLORIDE 9 MG/ML
1900 INJECTION INTRAMUSCULAR; INTRAVENOUS; SUBCUTANEOUS ONCE
Refills: 0 | Status: COMPLETED | OUTPATIENT
Start: 2023-08-09 | End: 2023-08-09

## 2023-08-09 RX ORDER — CEFPODOXIME PROXETIL 100 MG
1 TABLET ORAL
Qty: 14 | Refills: 0
Start: 2023-08-09 | End: 2023-08-15

## 2023-08-09 RX ORDER — CEFTRIAXONE 500 MG/1
1000 INJECTION, POWDER, FOR SOLUTION INTRAMUSCULAR; INTRAVENOUS ONCE
Refills: 0 | Status: COMPLETED | OUTPATIENT
Start: 2023-08-09 | End: 2023-08-09

## 2023-08-09 RX ADMIN — Medication 40 MILLIEQUIVALENT(S): at 17:00

## 2023-08-09 RX ADMIN — Medication 15 MILLIGRAM(S): at 18:47

## 2023-08-09 RX ADMIN — SODIUM CHLORIDE 1900 MILLILITER(S): 9 INJECTION INTRAMUSCULAR; INTRAVENOUS; SUBCUTANEOUS at 14:31

## 2023-08-09 RX ADMIN — CEFTRIAXONE 100 MILLIGRAM(S): 500 INJECTION, POWDER, FOR SOLUTION INTRAMUSCULAR; INTRAVENOUS at 14:31

## 2023-08-09 RX ADMIN — Medication 650 MILLIGRAM(S): at 14:28

## 2023-08-09 RX ADMIN — Medication 15 MILLIGRAM(S): at 15:03

## 2023-08-09 RX ADMIN — Medication 10 MILLIGRAM(S): at 18:47

## 2023-08-09 NOTE — ED PROVIDER NOTE - SECONDARY DIAGNOSIS.
Called Camille at [facility] to check status of ref again - message left    [facility] again and spoke with Fannie Kawasaki in the Authorization Dept ([phone] her that we have been waiting on an approval and pt ready for discharge  Per Fannie Kawasaki, she stated that they just received the ref today  Explained to her that when SW called [facility], was told that it was in process  Fannie Kawasaki stated the SNF auth was in process yest  She stated [facility] sent it to the wrong fax and they just received it today  She stated it was marked as \"expedited\" so will have up to 72 hrs but would hopefully be sooner  Explained this is holding up discharge    Called [facility] again and spoke with Isa Ya, supervisor  Explained that this ref was made to them on 4/12 and it is now 4/23 and we are still waiting on a decision  Also advised her that [facility] is stating that [facility] sent to the wrong fax  Isa Ya stated they have a note in the computer on 4/15 that it was faxed to [facility] Carranza stated she is sending an email to try an push ref along  Advised her this is holding up discharge    [employer] and spoke with Diony  Updated on above  He stated he cannot start her until it is financially cleared thru Admissions  Pt's chair time will be T-T-S @ 12:00p and pt can start there on 4/25, pending ins approval  Updates faxed to [facility] and left message for Wade Ames, dialysis mgr  Asked if pt could come here for OP dialysis until the insurance/financial clearance is approved  Await return call    Pt updated on above Fever

## 2023-08-09 NOTE — ED ADULT NURSE REASSESSMENT NOTE - NS ED NURSE REASSESS COMMENT FT1
pt received from previous RN for continuity of care, pt c/o headache denies dizziness and medicated as per MAR, sent to CXR, will continue to re assess and evaluate.

## 2023-08-09 NOTE — ED PROVIDER NOTE - CLINICAL SUMMARY MEDICAL DECISION MAKING FREE TEXT BOX
29 y/o F with Hx of pyelo with abscess presents with 2 days of fever without associated symptoms. On exam patient is afebrile. Vital signs are stable. Patient has bilateral CVA tenderness to palpation. Concern for sepsis versus viral syndrome. Plan for labs, CT abdomen/pelvis, UA, empiric IV ceftriaxone, IV hydration, and reassess. 29 y/o F with Hx of pyelo with abscess presents with 2 days of fever without associated symptoms. On exam patient is afebrile. Vital signs are stable. Patient has bilateral CVA tenderness to palpation. Concern for sepsis versus viral syndrome. Plan for labs, CT abdomen/pelvis, UA, empiric IV ceftriaxone, IV hydration, and reassess.    CXR clear.  CT a/p neg for pyelo, shows resolution of abscess.  COVID/ flu/ RSV neg.  LFTs elevated, as they were previously.  Possible viral syndrome.  RVP sent, pending.  UA with leuk esterase; will treat for UTI.  CT head neg.  Doubt acute bacterial meningitis clinically given no neck stiffness and full ROM on exam.      Pt feels better and wants to go home.  Afebrile throughout time in ED.  Strict return precautions discussed.  Stable for dc w plan for PMD follow up.

## 2023-08-09 NOTE — ED PROVIDER NOTE - NSFOLLOWUPINSTRUCTIONS_ED_ALL_ED_FT
Fever in Adults    WHAT YOU NEED TO KNOW:    A fever is an increase in your body temperature. Normal body temperature is 98.6°F (37°C). Fever is generally defined as greater than 100.4°F (38°C). Common causes include an infection, injury, or disease such as arthritis.    DISCHARGE INSTRUCTIONS:    Return to the emergency department if:    Your fever does not go away or gets worse even after treatment.    You have a stiff neck and a bad headache.    You are confused. You may not be able to think clearly or remember things like you normally do.    Your heart beats faster than usual even after treatment.    You have shortness of breath or chest pain when you breathe.    You urinate small amounts or not at all.    Your skin, lips, or nails turn blue.  Contact your healthcare provider if:    You have abdominal pain or you feel bloated.    You have nausea or are vomiting.    You have pain or burning when you urinate, or you have pain in your back.    You have questions or concerns about your condition or care.  Medicines: You may need any of the following:    NSAIDs, such as ibuprofen, help decrease swelling, pain, and fever. This medicine is available with or without a doctor's order. NSAIDs can cause stomach bleeding or kidney problems in certain people. If you take blood thinner medicine, always ask if NSAIDs are safe for you. Always read the medicine label and follow directions. Do not give these medicines to children younger than 6 months without direction from a healthcare provider.    Acetaminophen decreases pain and fever. It is available without a doctor's order. Ask how much to take and how often to take it. Follow directions. Read the labels of all other medicines you are using to see if they also contain acetaminophen, or ask your doctor or pharmacist. Acetaminophen can cause liver damage if not taken correctly.    Antibiotics may be given if you have an infection caused by bacteria.    Take your medicine as directed. Contact your healthcare provider if you think your medicine is not helping or if you have side effects. Tell your provider if you are allergic to any medicine. Keep a list of the medicines, vitamins, and herbs you take. Include the amounts, and when and why you take them. Bring the list or the pill bottles to follow-up visits. Carry your medicine list with you in case of an emergency.  Follow up with your healthcare provider as directed: Write down your questions so you remember to ask them during your visits.    Self-care:    Drink more liquids as directed. A fever makes you sweat. This can increase your risk for dehydration. Liquids can help prevent dehydration.  Drink at least 6 to 8 eight-ounce cups of clear liquids each day. Drink water, juice, or broth. Do not drink sports drinks. They may contain caffeine.    Ask your healthcare provider if you should drink an oral rehydration solution (ORS). An ORS has the right amounts of water, salts, and sugar you need to replace body fluids.    Dress in lightweight clothes. Shivers may be a sign that your fever is rising. Do not put extra blankets or clothes on. This may cause your fever to rise even higher. Dress in light, comfortable clothing. Use a lightweight blanket or sheet when you sleep. Change your clothes, blanket, or sheets if they get wet.    Cool yourself safely. Take a bath in cool or lukewarm water. Use an ice pack wrapped in a small towel or wet a washcloth with cool water. Place the ice pack or wet washcloth on your forehead or the back of your neck.        Urinary Tract Infection, Adult       A urinary tract infection (UTI) is an infection of any part of the urinary tract. The urinary tract includes the kidneys, ureters, bladder, and urethra. These organs make, store, and get rid of urine in the body.    An upper UTI affects the ureters and kidneys. A lower UTI affects the bladder and urethra.      What are the causes?    Most urinary tract infections are caused by bacteria in your genital area around your urethra, where urine leaves your body. These bacteria grow and cause inflammation of your urinary tract.      What increases the risk?    You are more likely to develop this condition if:  •You have a urinary catheter that stays in place.      •You are not able to control when you urinate or have a bowel movement (incontinence).    •You are female and you:  •Use a spermicide or diaphragm for birth control.      •Have low estrogen levels.      •Are pregnant.        •You have certain genes that increase your risk.      •You are sexually active.      •You take antibiotic medicines.    •You have a condition that causes your flow of urine to slow down, such as:  •An enlarged prostate, if you are male.      •Blockage in your urethra.      •A kidney stone.      •A nerve condition that affects your bladder control (neurogenic bladder).      •Not getting enough to drink, or not urinating often.      •You have certain medical conditions, such as:  •Diabetes.      •A weak disease-fighting system (immunesystem).      •Sickle cell disease.      •Gout.      •Spinal cord injury.          What are the signs or symptoms?    Symptoms of this condition include:  •Needing to urinate right away (urgency).      •Frequent urination. This may include small amounts of urine each time you urinate.      •Pain or burning with urination.      •Blood in the urine.      •Urine that smells bad or unusual.      •Trouble urinating.      •Cloudy urine.      •Vaginal discharge, if you are female.      •Pain in the abdomen or the lower back.      You may also have:  •Vomiting or a decreased appetite.      •Confusion.      •Irritability or tiredness.      •A fever or chills.      •Diarrhea.      The first symptom in older adults may be confusion. In some cases, they may not have any symptoms until the infection has worsened.      How is this diagnosed?    This condition is diagnosed based on your medical history and a physical exam. You may also have other tests, including:  •Urine tests.      •Blood tests.      •Tests for STIs (sexually transmitted infections).      If you have had more than one UTI, a cystoscopy or imaging studies may be done to determine the cause of the infections.      How is this treated?    Treatment for this condition includes:  •Antibiotic medicine.      •Over-the-counter medicines to treat discomfort.      •Drinking enough water to stay hydrated.      If you have frequent infections or have other conditions such as a kidney stone, you may need to see a health care provider who specializes in the urinary tract (urologist).    In rare cases, urinary tract infections can cause sepsis. Sepsis is a life-threatening condition that occurs when the body responds to an infection. Sepsis is treated in the hospital with IV antibiotics, fluids, and other medicines.      Follow these instructions at home:     Medicines     •Take over-the-counter and prescription medicines only as told by your health care provider.      •If you were prescribed an antibiotic medicine, take it as told by your health care provider. Do not stop using the antibiotic even if you start to feel better.      General instructions   •Make sure you:  •Empty your bladder often and completely. Do not hold urine for long periods of time.      •Empty your bladder after sex.      •Wipe from front to back after urinating or having a bowel movement if you are female. Use each tissue only one time when you wipe.        •Drink enough fluid to keep your urine pale yellow.      •Keep all follow-up visits. This is important.        Contact a health care provider if:    •Your symptoms do not get better after 1–2 days.      •Your symptoms go away and then return.        Get help right away if:    •You have severe pain in your back or your lower abdomen.      •You have a fever or chills.      •You have nausea or vomiting.        Summary    •A urinary tract infection (UTI) is an infection of any part of the urinary tract, which includes the kidneys, ureters, bladder, and urethra.      •Most urinary tract infections are caused by bacteria in your genital area.      •Treatment for this condition often includes antibiotic medicines.      •If you were prescribed an antibiotic medicine, take it as told by your health care provider. Do not stop using the antibiotic even if you start to feel better.      •Keep all follow-up visits. This is important.      This information is not intended to replace advice given to you by your health care provider. Make sure you discuss any questions you have with your health care provider.

## 2023-08-09 NOTE — ED PROVIDER NOTE - PATIENT'S PREFERRED PRONOUN
Alprazolam refilled  He is still taking 1 tablet 3 times a day and as needed  No concern for diversion  Follow-up 3 months Her/She

## 2023-08-09 NOTE — ED PROVIDER NOTE - PATIENT PORTAL LINK FT
You can access the FollowMyHealth Patient Portal offered by [facility] by registering at the following website: [email or web address]. By joining [facility]’s FollowMyHealth portal, you will also be able to view your health information using other applications (apps) compatible with our system.

## 2023-08-09 NOTE — ED PROVIDER NOTE - OBJECTIVE STATEMENT
29 y/o F with Hx of left sided pyelonephritis and nephric abscess for which patient was admitted 2 months ago, presents with 2 days of fever and chills without associated symptoms. Patient denies chest pain, shortness of breath, cough, sore throat, or abdominal pain. Patient has chronic bilateral flank pain which is unchanged. No rash. Patient recently traveled to [place] and [place] where she had many mosquito bites but no tick bites. No vomiting or diarrhea. Patient completed 2 weeks of bactrim after hospital discharge and states she was supposed to follow up for repeat CT but has not done so yet.

## 2023-08-09 NOTE — ED ADULT TRIAGE NOTE - CHIEF COMPLAINT QUOTE
Pt walks c/o fever (TMax 102) for 2 days, minimally relieved with acetaminophen, with neck pain and headache. Pt last took acetaminophen 6 hrs PTA. Pt also c/o increased urinary frequency. PMH of pyelonephritis with abscess. Pt denies any flank pain at this time.

## 2023-08-09 NOTE — ED ADULT NURSE NOTE - OBJECTIVE STATEMENT
Patient presents with c/o fever (TMax 102) for 2 days, minimally relieved with acetaminophen, with neck pain, headache, chills, dizziness, weakness. Pt last took acetaminophen 6 hrs PTA. Pt also c/o increased urinary frequency and urgency. PMH of pyelonephritis with abscess. Pt denies any flank pain at this time. Afebrile in the ED.

## 2023-08-09 NOTE — ED PROVIDER NOTE - PHYSICAL EXAMINATION
VITAL SIGNS: I have reviewed nursing notes and confirm.   CONST: Well appearing; No apparent distress.  ENT: No nasal discharge; airway clear.  EYES: Sclera clear. Pupils round and symmetrical bilaterally.  RESP: Breathing comfortably; speaking in full sentences.   ABD: Soft; non-distended; non-tender;  : +Bilateral CVA tenderness bilaterally.  MSK: No acute deformities noted to extremities. No tenderness to cervical/thoracic/lumbar spine to palpation.  NEURO: Alert, oriented. Speech is fluent and appropriate. Moving all extremities appropriately. No gross motor or sensory abnormalities.   SKIN: Skin is normal temperature; no diaphoresis; no pallor.   PSYCH: Cooperative. Appropriate mood, language, and behavior.

## 2023-08-10 LAB
RAPID RVP RESULT: SIGNIFICANT CHANGE UP
SARS-COV-2 RNA SPEC QL NAA+PROBE: SIGNIFICANT CHANGE UP

## 2023-08-11 PROBLEM — N12 TUBULO-INTERSTITIAL NEPHRITIS, NOT SPECIFIED AS ACUTE OR CHRONIC: Chronic | Status: ACTIVE | Noted: 2023-08-09

## 2023-08-11 LAB
CULTURE RESULTS: SIGNIFICANT CHANGE UP
SPECIMEN SOURCE: SIGNIFICANT CHANGE UP

## 2023-08-15 ENCOUNTER — APPOINTMENT (OUTPATIENT)
Dept: INFECTIOUS DISEASE | Facility: CLINIC | Age: 28
End: 2023-08-15
Payer: COMMERCIAL

## 2023-08-15 ENCOUNTER — APPOINTMENT (OUTPATIENT)
Dept: INFECTIOUS DISEASE | Facility: CLINIC | Age: 28
End: 2023-08-15

## 2023-08-15 VITALS
DIASTOLIC BLOOD PRESSURE: 85 MMHG | WEIGHT: 250.13 LBS | HEART RATE: 95 BPM | SYSTOLIC BLOOD PRESSURE: 125 MMHG | TEMPERATURE: 96.6 F | BODY MASS INDEX: 39.26 KG/M2 | OXYGEN SATURATION: 98 % | HEIGHT: 67 IN

## 2023-08-15 DIAGNOSIS — N12 TUBULO-INTERSTITIAL NEPHRITIS, NOT SPECIFIED AS ACUTE OR CHRONIC: ICD-10-CM

## 2023-08-15 DIAGNOSIS — D72.819 DECREASED WHITE BLOOD CELL COUNT, UNSPECIFIED: ICD-10-CM

## 2023-08-15 DIAGNOSIS — Z83.3 FAMILY HISTORY OF DIABETES MELLITUS: ICD-10-CM

## 2023-08-15 DIAGNOSIS — F12.91 CANNABIS USE, UNSPECIFIED, IN REMISSION: ICD-10-CM

## 2023-08-15 DIAGNOSIS — R74.8 ABNORMAL LEVELS OF OTHER SERUM ENZYMES: ICD-10-CM

## 2023-08-15 DIAGNOSIS — Z78.9 OTHER SPECIFIED HEALTH STATUS: ICD-10-CM

## 2023-08-15 PROCEDURE — 99214 OFFICE O/P EST MOD 30 MIN: CPT | Mod: 25

## 2023-08-15 PROCEDURE — 36415 COLL VENOUS BLD VENIPUNCTURE: CPT

## 2023-08-15 RX ORDER — UBIDECARENONE/VIT E ACET 100MG-5
CAPSULE ORAL
Refills: 0 | Status: ACTIVE | COMMUNITY

## 2023-08-15 RX ORDER — CIPROFLOXACIN HYDROCHLORIDE 500 MG/1
500 TABLET, FILM COATED ORAL
Refills: 0 | Status: ACTIVE | COMMUNITY

## 2023-08-15 RX ORDER — LEVONORGESTREL 52 MG/1
INTRAUTERINE DEVICE INTRAUTERINE
Refills: 0 | Status: ACTIVE | COMMUNITY

## 2023-08-15 RX ORDER — LISDEXAMFETAMINE DIMESYLATE 30 MG/1
30 TABLET, CHEWABLE ORAL
Refills: 0 | Status: ACTIVE | COMMUNITY

## 2023-08-15 RX ORDER — LEVOTHYROXINE SODIUM 0.07 MG/1
75 TABLET ORAL
Refills: 0 | Status: ACTIVE | COMMUNITY

## 2023-08-15 NOTE — HISTORY OF PRESENT ILLNESS
[FreeTextEntry1] : 27 y/o F with PMH anxiety, ADHD, hypothyroidism presented to [facility] on 6/16 with L flank pain x 2 weeks. She went to [facility] on 6/14 where she was tachycardic, UA positive for WBC but patient not symptomatic at that time, also found to have elevated LFTs, but no RUQ pain. She was offered further imaging at that time, but declined preferring to follow up outpatient, discharged w/o abx. Cultures later grew E coli and was referred to ED to r/o pyelonephritis. CT with ~1 cm rounded hypodensity in upper pole that may represent early abscess formation, bilateral hyperenhancement of ureteral walls bilaterally. She was given pip-tazo then ceftriaxone. Was discharged to continue Bactrim 1 DS q 12 h x 14 days (6/19 - 7/3) with plan for repeat CT prior to discontinuing.  She was lost to f/u after d/c.   She was in [place] where she bought lobsters, packed in ice water and seaweed. She then drove 6 hours to [place]. Had the lobsters on 8/5.  She started having fever, night sweats, chills, neck pain and headache on 8/7. She was taking acetaminophen q 6 h. Was also having urinary frequency.  She presented to [facility] on 8/9. In ED, CRP >120, WBC 3.72, lymph 0.36/9.7%, K+ 3.3, alk phos 328, , , RVP negative, blood cultures x 2 NG, UCx negative, CT abd/pelvis showed resolution of L pyelo. Head CT showed no acute intracranial pathology.  Was given cefpodoxime for suspected UTI. Symptoms resolved on 8/13. No sick contacts, no one else eating lobster became ill. She had EBV in 2017.  She did not swim while in [place] and had no known tick bite.

## 2023-08-15 NOTE — REVIEW OF SYSTEMS
[As Noted in HPI] : as noted in HPI [Eye Pain] : no eye pain [Eyesight Problems] : no eyesight problems [Nasal Discharge] : no nasal discharge [Sore Throat] : no sore throat [Chest Pain] : no chest pain [Shortness Of Breath] : no shortness of breath [Cough] : no cough [Abdominal Pain] : no abdominal pain [Vomiting] : no vomiting [Dysuria] : no dysuria [Joint Pain] : no joint pain [Joint Swelling] : no joint swelling [Skin Lesions] : no skin lesions

## 2023-08-15 NOTE — PHYSICAL EXAM
[General Appearance - Alert] : alert [General Appearance - In No Acute Distress] : in no acute distress [General Appearance - Well-Appearing] : healthy appearing [Sclera] : the sclera and conjunctiva were normal [PERRL With Normal Accommodation] : pupils were equal in size, round, reactive to light [Outer Ear] : the ears and nose were normal in appearance [Examination Of The Oral Cavity] : the lips and gums were normal [Oropharynx] : the oropharynx was normal with no thrush [Auscultation Breath Sounds / Voice Sounds] : lungs were clear to auscultation bilaterally [Heart Rate And Rhythm] : heart rate was normal and rhythm regular [Heart Sounds] : normal S1 and S2 [Murmurs] : no murmurs [Edema] : there was no peripheral edema [Bowel Sounds] : normal bowel sounds [Abdomen Soft] : soft [No Palpable Adenopathy] : no palpable adenopathy [Musculoskeletal - Swelling] : no joint swelling [Skin Color & Pigmentation] : normal skin color and pigmentation [] : no rash [FreeTextEntry1] : Mild RUQ tenderness to palpation.  Liver palpable 1-2 FBs below RCM

## 2023-08-15 NOTE — ASSESSMENT
[FreeTextEntry1] : 29 y/o F with PMH anxiety, ADHD, hypothyroidism with L pyelo in June. She was given pip-tazo then ceftriaxone while inpatient. Was discharged to continue Bactrim 1 DS q 12 h x 14 days (6/19 - 7/3). CT done 8/9 showed resolution of abscess. Now with fever, night sweats, headache x 1 week. Symptoms have mostly resolved however is noted to have RUQ tenderness on exam with possible hepatomegaly.  Plan: - D/c cefpodoxime as urinalysis and urine culture were negative - Check CBC, CMP, ESR, CRP  - Tick-borne disease panel - CMV IgG/IgM  - EBV serology - Hepatitis A IgG antibody with reflex to IgM - Acute hepatitis panel - Hep B surface antibody - Leptospirosis antibodies Will contact patient with results. F/u PRN.

## 2023-08-15 NOTE — DATA REVIEWED
[FreeTextEntry1] : CT A/P w/ IVC (8/9):  KIDNEYS/URETERS: Normal right kidney. Focal area of  cortical  scarring/slight indentation upper pole left kidney in location of  previously noted small fluid collection which has resolved. Normal  perfusion of both kidneys. No collecting system obstruction bilaterally.  No evidence of residual pyelonephritis.

## 2023-08-16 LAB
ALBUMIN SERPL ELPH-MCNC: 4.8 G/DL
ALP BLD-CCNC: 540 U/L
ALT SERPL-CCNC: 341 U/L
ANION GAP SERPL CALC-SCNC: 12 MMOL/L
AST SERPL-CCNC: 219 U/L
BILIRUB SERPL-MCNC: 0.7 MG/DL
BUN SERPL-MCNC: 6 MG/DL
CALCIUM SERPL-MCNC: 10 MG/DL
CHLORIDE SERPL-SCNC: 98 MMOL/L
CO2 SERPL-SCNC: 28 MMOL/L
CREAT SERPL-MCNC: 0.69 MG/DL
CRP SERPL-MCNC: 9 MG/L
EGFR: 121 ML/MIN/1.73M2
ERYTHROCYTE [SEDIMENTATION RATE] IN BLOOD BY WESTERGREN METHOD: 15 MM/HR
GLUCOSE SERPL-MCNC: 108 MG/DL
HAV IGM SER QL: NONREACTIVE
HBV CORE IGM SER QL: NONREACTIVE
HBV SURFACE AB SER QL: REACTIVE
HBV SURFACE AG SER QL: NONREACTIVE
HCV AB SER QL: NONREACTIVE
HCV S/CO RATIO: 0.12 S/CO
HEPATITIS A IGG ANTIBODY: NONREACTIVE
POTASSIUM SERPL-SCNC: 4.8 MMOL/L
PROT SERPL-MCNC: 7.9 G/DL
SODIUM SERPL-SCNC: 138 MMOL/L

## 2023-08-17 LAB
A PHAGOCYTOPH IGG TITR SER IF: NORMAL TITER
B BURGDOR AB SER QL IA: NEGATIVE
B MICROTI IGG TITR SER: NORMAL TITER
CMV IGG SERPL QL: 3 U/ML
CMV IGG SERPL-IMP: POSITIVE
CMV IGM SERPL QL: <8 AU/ML
CMV IGM SERPL QL: NEGATIVE
E CHAFFEENSIS IGG TITR SER IF: NORMAL TITER
EBV EA AB SER IA-ACNC: 86.4 U/ML
EBV EA AB TITR SER IF: POSITIVE
EBV EA IGG SER QL IA: 219 U/ML
EBV EA IGG SER-ACNC: POSITIVE
EBV EA IGM SER IA-ACNC: NEGATIVE
EBV PATRN SPEC IB-IMP: NORMAL
EBV VCA IGG SER IA-ACNC: >750 U/ML
EBV VCA IGM SER QL IA: <10 U/ML
EPSTEIN-BARR VIRUS CAPSID ANTIGEN IGG: POSITIVE
LEPTOSPIRA AB SER QL: NEGATIVE

## 2024-07-17 ENCOUNTER — EMERGENCY (EMERGENCY)
Facility: HOSPITAL | Age: 29
LOS: 1 days | Discharge: HOME | End: 2024-07-17
Admitting: EMERGENCY MEDICINE
Payer: COMMERCIAL

## 2024-07-17 VITALS
SYSTOLIC BLOOD PRESSURE: 117 MMHG | OXYGEN SATURATION: 96 % | RESPIRATION RATE: 18 BRPM | TEMPERATURE: 98 F | HEART RATE: 70 BPM | DIASTOLIC BLOOD PRESSURE: 82 MMHG

## 2024-07-17 DIAGNOSIS — Z88.1 ALLERGY STATUS TO OTHER ANTIBIOTIC AGENTS: ICD-10-CM

## 2024-07-17 DIAGNOSIS — R07.89 OTHER CHEST PAIN: ICD-10-CM

## 2024-07-17 DIAGNOSIS — R06.02 SHORTNESS OF BREATH: ICD-10-CM

## 2024-07-17 LAB
ALBUMIN SERPL ELPH-MCNC: 3.4 G/DL — SIGNIFICANT CHANGE UP (ref 3.4–5)
ALP SERPL-CCNC: 103 U/L — SIGNIFICANT CHANGE UP (ref 40–120)
ALT FLD-CCNC: 87 U/L — HIGH (ref 12–42)
ANION GAP SERPL CALC-SCNC: 7 MMOL/L — LOW (ref 9–16)
AST SERPL-CCNC: 37 U/L — SIGNIFICANT CHANGE UP (ref 15–37)
BASOPHILS # BLD AUTO: 0.07 K/UL — SIGNIFICANT CHANGE UP (ref 0–0.2)
BASOPHILS NFR BLD AUTO: 0.8 % — SIGNIFICANT CHANGE UP (ref 0–2)
BILIRUB SERPL-MCNC: 0.3 MG/DL — SIGNIFICANT CHANGE UP (ref 0.2–1.2)
BUN SERPL-MCNC: 13 MG/DL — SIGNIFICANT CHANGE UP (ref 7–23)
CALCIUM SERPL-MCNC: 9.2 MG/DL — SIGNIFICANT CHANGE UP (ref 8.5–10.5)
CHLORIDE SERPL-SCNC: 105 MMOL/L — SIGNIFICANT CHANGE UP (ref 96–108)
CO2 SERPL-SCNC: 27 MMOL/L — SIGNIFICANT CHANGE UP (ref 22–31)
CREAT SERPL-MCNC: 0.83 MG/DL — SIGNIFICANT CHANGE UP (ref 0.5–1.3)
D DIMER BLD IA.RAPID-MCNC: <187 NG/ML DDU — SIGNIFICANT CHANGE UP
EGFR: 98 ML/MIN/1.73M2 — SIGNIFICANT CHANGE UP
EOSINOPHIL # BLD AUTO: 0.23 K/UL — SIGNIFICANT CHANGE UP (ref 0–0.5)
EOSINOPHIL NFR BLD AUTO: 2.5 % — SIGNIFICANT CHANGE UP (ref 0–6)
GLUCOSE SERPL-MCNC: 114 MG/DL — HIGH (ref 70–99)
HCG SERPL-ACNC: <1 MIU/ML — SIGNIFICANT CHANGE UP
HCT VFR BLD CALC: 38.5 % — SIGNIFICANT CHANGE UP (ref 34.5–45)
HGB BLD-MCNC: 12.9 G/DL — SIGNIFICANT CHANGE UP (ref 11.5–15.5)
IMM GRANULOCYTES NFR BLD AUTO: 0.3 % — SIGNIFICANT CHANGE UP (ref 0–0.9)
LYMPHOCYTES # BLD AUTO: 2.89 K/UL — SIGNIFICANT CHANGE UP (ref 1–3.3)
LYMPHOCYTES # BLD AUTO: 31.9 % — SIGNIFICANT CHANGE UP (ref 13–44)
MCHC RBC-ENTMCNC: 31.6 PG — SIGNIFICANT CHANGE UP (ref 27–34)
MCHC RBC-ENTMCNC: 33.5 GM/DL — SIGNIFICANT CHANGE UP (ref 32–36)
MCV RBC AUTO: 94.4 FL — SIGNIFICANT CHANGE UP (ref 80–100)
MONOCYTES # BLD AUTO: 0.71 K/UL — SIGNIFICANT CHANGE UP (ref 0–0.9)
MONOCYTES NFR BLD AUTO: 7.8 % — SIGNIFICANT CHANGE UP (ref 2–14)
NEUTROPHILS # BLD AUTO: 5.13 K/UL — SIGNIFICANT CHANGE UP (ref 1.8–7.4)
NEUTROPHILS NFR BLD AUTO: 56.7 % — SIGNIFICANT CHANGE UP (ref 43–77)
NRBC # BLD: 0 /100 WBCS — SIGNIFICANT CHANGE UP (ref 0–0)
PLATELET # BLD AUTO: 236 K/UL — SIGNIFICANT CHANGE UP (ref 150–400)
POTASSIUM SERPL-MCNC: 4.1 MMOL/L — SIGNIFICANT CHANGE UP (ref 3.5–5.3)
POTASSIUM SERPL-SCNC: 4.1 MMOL/L — SIGNIFICANT CHANGE UP (ref 3.5–5.3)
PROT SERPL-MCNC: 7 G/DL — SIGNIFICANT CHANGE UP (ref 6.4–8.2)
RBC # BLD: 4.08 M/UL — SIGNIFICANT CHANGE UP (ref 3.8–5.2)
RBC # FLD: 11.2 % — SIGNIFICANT CHANGE UP (ref 10.3–14.5)
SODIUM SERPL-SCNC: 139 MMOL/L — SIGNIFICANT CHANGE UP (ref 132–145)
WBC # BLD: 9.06 K/UL — SIGNIFICANT CHANGE UP (ref 3.8–10.5)
WBC # FLD AUTO: 9.06 K/UL — SIGNIFICANT CHANGE UP (ref 3.8–10.5)

## 2024-07-17 PROCEDURE — 71046 X-RAY EXAM CHEST 2 VIEWS: CPT | Mod: 26

## 2024-07-17 PROCEDURE — 99285 EMERGENCY DEPT VISIT HI MDM: CPT

## 2025-03-10 NOTE — DISCHARGE NOTE NURSING/CASE MANAGEMENT/SOCIAL WORK - NSDCVIVACCINE_GEN_ALL_CORE_FT
Caller: JESS RUGGIERO    Relationship: Emergency Contact    Best call back number: [phone]     Requested Prescriptions:   Requested Prescriptions     Pending Prescriptions Disp Refills    lisdexamfetamine (Vyvanse) 50 MG capsule 30 capsule 0     Sig: Take 1 capsule by mouth Every Morning        Pharmacy where request should be sent: [facility] #[id number] - [facility] [place] [address] [phone] [facility] [phone] FX     Last office visit with prescribing clinician: 11/25/2024   Last telemedicine visit with prescribing clinician: Visit date not found   Next office visit with prescribing clinician: Visit date not found       Does the patient have less than a 3 day supply:  [] Yes  [x] No    
No Vaccines Administered.